# Patient Record
Sex: FEMALE | Race: WHITE | NOT HISPANIC OR LATINO | URBAN - METROPOLITAN AREA
[De-identification: names, ages, dates, MRNs, and addresses within clinical notes are randomized per-mention and may not be internally consistent; named-entity substitution may affect disease eponyms.]

---

## 2022-07-07 RX ORDER — PANTOPRAZOLE SODIUM 40 MG/1
TABLET, DELAYED RELEASE ORAL
COMMUNITY
End: 2022-07-30 | Stop reason: CLARIF

## 2022-07-07 RX ORDER — FLUOXETINE HYDROCHLORIDE 40 MG/1
1 CAPSULE ORAL
COMMUNITY
End: 2022-07-30 | Stop reason: CLARIF

## 2022-07-07 RX ORDER — GABAPENTIN 100 MG/1
1 CAPSULE ORAL
COMMUNITY
End: 2022-07-30 | Stop reason: CLARIF

## 2022-07-07 RX ORDER — LISINOPRIL 40 MG/1
TABLET ORAL
COMMUNITY
End: 2022-07-30 | Stop reason: CLARIF

## 2022-07-07 RX ORDER — LIDOCAINE 5 G/100G
CREAM RECTAL; TOPICAL
COMMUNITY
End: 2022-07-30 | Stop reason: CLARIF

## 2022-07-07 RX ORDER — OXYCODONE HYDROCHLORIDE 5 MG/1
TABLET ORAL
COMMUNITY

## 2022-07-30 PROBLEM — R13.10 DYSPHAGIA: Status: ACTIVE | Noted: 2022-07-30

## 2022-07-30 PROBLEM — M16.11 OSTEOARTHRITIS OF RIGHT HIP: Status: ACTIVE | Noted: 2022-07-30

## 2022-07-30 PROBLEM — K22.2 ESOPHAGEAL STRICTURE: Status: ACTIVE | Noted: 2022-07-30

## 2022-08-01 LAB
ANION GAP SERPL CALC-SCNC: 15 MMOL/L (ref 2–17)
APPEARANCE UR: CLEAR
BASOPHILS # BLD AUTO: 0 X10E3/MCL (ref 0–0.2)
BASOPHILS NFR BLD AUTO: 0.5 % (ref 0–2)
BILIRUB UR STRIP.AUTO-MCNC: NEGATIVE MG/DL
BUN SERPL-MCNC: 13 MG/DL (ref 8–23)
CALCIUM SERPL-MCNC: 9.9 MG/DL (ref 8.8–10.2)
CHLORIDE SERPL-SCNC: 84 MMOL/L (ref 98–107)
COLOR UR: NORMAL
CREAT SERPL-MCNC: 0.5 MG/DL (ref 0.5–1)
DEPRECATED HCO3 PLAS-SCNC: 22 MMOL/L (ref 22–29)
EOSINOPHIL # BLD AUTO: 0.1 X10E3/MCL (ref 0–0.5)
EOSINOPHIL NFR BLD AUTO: 1.3 % (ref 0–7)
ERYTHROCYTE [DISTWIDTH] IN BLOOD BY AUTOMATED COUNT: 13.3 % (ref 11–16)
GFR SERPL CREATININE-BSD FRML MDRD: 95 ML/MIN/1.73M²
GLUCOSE SERPL-MCNC: 96 MG/DL (ref 70–99)
GLUCOSE UR STRIP.AUTO-MCNC: NEGATIVE MG/DL
HCT VFR BLD AUTO: 32.8 % (ref 34–47)
HGB BLD-MCNC: 11.5 G/DL (ref 11.5–15.7)
IMMATURE GRANS (ABS): 0.02 X10E3/MCL (ref 0–0.06)
IMMATURE GRANULOCYTES: 0.3 % (ref 0–0.6)
INFLUENZA A: NOT DETECTED
INFLUENZA B: NOT DETECTED
KETONES UR STRIP.AUTO-MCNC: NEGATIVE MG/DL
LEUKOCYTE ESTERASE UR QL STRIP: NEGATIVE
LYMPHOCYTES # SPEC AUTO: 1.3 X10E3/MCL (ref 1–3.2)
LYMPHOCYTES NFR BLD AUTO: 20.9 % (ref 15–45)
MCH RBC QN AUTO: 32.7 PG (ref 27–34.5)
MCHC RBC AUTO-ENTMCNC: 35.1 G/DL (ref 32–36)
MCV RBC AUTO: 93.2 FL (ref 81–99)
MONOCYTES # BLD AUTO: 0.6 X10E3/MCL (ref 0.3–1)
MONOCYTES NFR BLD AUTO: 9.5 % (ref 4–12)
NEUTROPHILS # BLD AUTO: 4 X10E3/MCL (ref 1.6–7.3)
NEUTROPHILS NFR BLD AUTO: 67.5 % (ref 42–74)
NITRITE UR QL STRIP.AUTO: NEGATIVE
OSMOLALITY SERPL CALC.SUM OF ELEC: 242 MOSM/KG (ref 270–287)
PH UR STRIP.AUTO: 7 [PH] (ref 4.5–8)
PLATELET # BLD AUTO: 230 X10E3/MCL (ref 140–440)
PMV BLD AUTO: 8.9 FL (ref 7.2–13.2)
POTASSIUM SERPL-SCNC: 4.8 MMOL/L (ref 3.5–5.3)
PROT UR QL STRIP: NEGATIVE
RBC # BLD AUTO: 3.52 X10E6/MCL (ref 3.6–5.2)
RBC # UR STRIP: NEGATIVE /UL
SARS-COV-2: NOT DETECTED
SODIUM SERPL-SCNC: 120 MMOL/L (ref 135–145)
SP GR UR STRIP: <=1.005 (ref 1–1.03)
TROPONIN T SERPL-MCNC: <0.01 NG/ML (ref 0–0.01)
UROBILINOGEN UR STRIP-MCNC: 0.2 EU/DL
WBC # BLD AUTO: 6 X10E3/MCL (ref 3.8–10.6)

## 2022-08-02 LAB
ALBUMIN SERPL-MCNC: 3.7 G/DL (ref 3.5–5.2)
ALBUMIN/GLOB SERPL: 2 {RATIO} (ref 1–2.7)
ALP SERPL-CCNC: 112 UNIT/L (ref 35–117)
ALT SERPL-CCNC: 13 UNIT/L (ref 0–35)
ANION GAP SERPL CALC-SCNC: 13 MMOL/L (ref 2–17)
ANION GAP SERPL CALC-SCNC: 15 MMOL/L (ref 2–17)
ANION GAP SERPL CALC-SCNC: 17 MMOL/L (ref 2–17)
APPEARANCE UR: CLEAR
AST SERPL-CCNC: 40 UNIT/L (ref 0–35)
BASOPHILS # BLD AUTO: 0 X10E3/MCL (ref 0–0.2)
BASOPHILS NFR BLD AUTO: 0.7 % (ref 0–2)
BILIRUB SERPL-MCNC: 0.27 MG/DL (ref 0–1.2)
BILIRUB UR STRIP.AUTO-MCNC: NEGATIVE MG/DL
BLOOD, URINE: NEGATIVE
BUN SERPL-MCNC: 14 MG/DL (ref 8–23)
BUN SERPL-MCNC: 14 MG/DL (ref 8–23)
BUN SERPL-MCNC: 15 MG/DL (ref 8–23)
C DIFF TOXIN/ANTIGEN: NEGATIVE
CALCIUM SERPL-MCNC: 9.1 MG/DL (ref 8.8–10.2)
CALCIUM SERPL-MCNC: 9.5 MG/DL (ref 8.8–10.2)
CALCIUM SERPL-MCNC: 9.5 MG/DL (ref 8.8–10.2)
CHLORIDE SERPL-SCNC: 91 MMOL/L (ref 98–107)
COLOR UR: NORMAL
CORTISOL - AM: 12.2 MCG/DL (ref 6–18.4)
CREAT SERPL-MCNC: 0.6 MG/DL (ref 0.5–1)
CREAT SERPL-MCNC: 0.7 MG/DL (ref 0.5–1)
CREAT SERPL-MCNC: 0.8 MG/DL (ref 0.5–1)
CREATINE UR-MCNC: 56.7 MG/DL (ref 28–217)
DEPRECATED HCO3 PLAS-SCNC: 19 MMOL/L (ref 22–29)
DEPRECATED HCO3 PLAS-SCNC: 19 MMOL/L (ref 22–29)
DEPRECATED HCO3 PLAS-SCNC: 20 MMOL/L (ref 22–29)
EOSINOPHIL # BLD AUTO: 0.1 X10E3/MCL (ref 0–0.5)
EOSINOPHIL NFR BLD AUTO: 1.8 % (ref 0–7)
ERYTHROCYTE [DISTWIDTH] IN BLOOD BY AUTOMATED COUNT: 13.8 % (ref 11–16)
GFR SERPL CREATININE-BSD FRML MDRD: 75 ML/MIN/1.73M²
GFR SERPL CREATININE-BSD FRML MDRD: 88 ML/MIN/1.73M²
GFR SERPL CREATININE-BSD FRML MDRD: 91 ML/MIN/1.73M²
GLOBULIN SER CALC-MCNC: 2.3 G/DL (ref 1.9–4.4)
GLUCOSE SERPL-MCNC: 112 MG/DL (ref 70–99)
GLUCOSE SERPL-MCNC: 140 MG/DL (ref 70–99)
GLUCOSE SERPL-MCNC: 90 MG/DL (ref 70–99)
GLUCOSE UR STRIP.AUTO-MCNC: NEGATIVE MG/DL
HCT VFR BLD AUTO: 32.7 % (ref 34–47)
HGB BLD-MCNC: 11 G/DL (ref 11.5–15.7)
IMMATURE GRANS (ABS): 0.01 X10E3/MCL (ref 0–0.06)
IMMATURE GRANULOCYTES: 0.2 % (ref 0–0.6)
KETONES UR STRIP.AUTO-MCNC: NEGATIVE MG/DL
LEUKOCYTE ESTERASE UR QL STRIP: NEGATIVE
LYMPHOCYTES # SPEC AUTO: 0.9 X10E3/MCL (ref 1–3.2)
LYMPHOCYTES NFR BLD AUTO: 16.5 % (ref 15–45)
MAGNESIUM SERPL-MCNC: 2 MG/DL (ref 1.6–2.6)
MCH RBC QN AUTO: 32.2 PG (ref 27–34.5)
MCHC RBC AUTO-ENTMCNC: 33.6 G/DL (ref 32–36)
MCV RBC AUTO: 95.6 FL (ref 81–99)
MONOCYTES # BLD AUTO: 0.5 X10E3/MCL (ref 0.3–1)
MONOCYTES NFR BLD AUTO: 8.6 % (ref 4–12)
NEUTROPHILS # BLD AUTO: 4.1 X10E3/MCL (ref 1.6–7.3)
NEUTROPHILS NFR BLD AUTO: 72.2 % (ref 42–74)
NITRITE UR QL STRIP.AUTO: NEGATIVE
OSMOLALITY SERPL CALC.SUM OF ELEC: 249 MOSM/KG (ref 270–287)
OSMOLALITY SERPL CALC.SUM OF ELEC: 254 MOSM/KG (ref 270–287)
OSMOLALITY SERPL CALC.SUM OF ELEC: 256 MOSM/KG (ref 270–287)
OSMOLALITY UR: 333 MOSM/KG (ref 50–1400)
PH UR STRIP.AUTO: 7 [PH] (ref 4.5–8)
PLATELET # BLD AUTO: 233 X10E3/MCL (ref 140–440)
PMV BLD AUTO: 9.4 FL (ref 7.2–13.2)
POTASSIUM SERPL-SCNC: 4.2 MMOL/L (ref 3.5–5.3)
POTASSIUM SERPL-SCNC: 4.5 MMOL/L (ref 3.5–5.3)
POTASSIUM SERPL-SCNC: 4.6 MMOL/L (ref 3.5–5.3)
POTASSIUM SERPL-SCNC: ABNORMAL MMOL/L (ref 3.5–5.3)
PROT SERPL-MCNC: 6 G/DL (ref 6.4–8.3)
PROT UR QL STRIP: NEGATIVE
RBC # BLD AUTO: 3.42 X10E6/MCL (ref 3.6–5.2)
SODIUM SERPL-SCNC: 124 MMOL/L (ref 135–145)
SODIUM SERPL-SCNC: 125 MMOL/L (ref 135–145)
SODIUM SERPL-SCNC: 127 MMOL/L (ref 135–145)
SODIUM UR-SCNC: 37 MMOL/L
SP GR UR STRIP: <=1.005 (ref 1–1.03)
TSH SERPL DL<=0.05 MIU/L-ACNC: 2.12 MCIU/ML (ref 0.36–3.74)
UROBILINOGEN UR STRIP-MCNC: 0.2 EU/DL
WBC # BLD AUTO: 5.7 X10E3/MCL (ref 3.8–10.6)

## 2022-08-03 LAB
ANION GAP SERPL CALC-SCNC: 12 MMOL/L (ref 2–17)
ANION GAP SERPL CALC-SCNC: 12 MMOL/L (ref 2–17)
ANION GAP SERPL CALC-SCNC: 13 MMOL/L (ref 2–17)
BUN SERPL-MCNC: 10 MG/DL (ref 8–23)
BUN SERPL-MCNC: 11 MG/DL (ref 8–23)
BUN SERPL-MCNC: 13 MG/DL (ref 8–23)
CALCIUM SERPL-MCNC: 8.7 MG/DL (ref 8.8–10.2)
CALCIUM SERPL-MCNC: 9.1 MG/DL (ref 8.8–10.2)
CALCIUM SERPL-MCNC: 9.2 MG/DL (ref 8.8–10.2)
CHLORIDE SERPL-SCNC: 94 MMOL/L (ref 98–107)
CHLORIDE SERPL-SCNC: 94 MMOL/L (ref 98–107)
CHLORIDE SERPL-SCNC: 97 MMOL/L (ref 98–107)
CREAT SERPL-MCNC: 0.6 MG/DL (ref 0.5–1)
DEPRECATED HCO3 PLAS-SCNC: 19 MMOL/L (ref 22–29)
DEPRECATED HCO3 PLAS-SCNC: 21 MMOL/L (ref 22–29)
DEPRECATED HCO3 PLAS-SCNC: 21 MMOL/L (ref 22–29)
ERYTHROCYTE [DISTWIDTH] IN BLOOD BY AUTOMATED COUNT: 13.6 % (ref 11–16)
GFR SERPL CREATININE-BSD FRML MDRD: 91 ML/MIN/1.73M²
GLUCOSE SERPL-MCNC: 81 MG/DL (ref 70–99)
GLUCOSE SERPL-MCNC: 83 MG/DL (ref 70–99)
GLUCOSE SERPL-MCNC: 98 MG/DL (ref 70–99)
HCT VFR BLD AUTO: 30.6 % (ref 34–47)
HGB BLD-MCNC: 10.5 G/DL (ref 11.5–15.7)
MCH RBC QN AUTO: 32.8 PG (ref 27–34.5)
MCHC RBC AUTO-ENTMCNC: 34.3 G/DL (ref 32–36)
MCV RBC AUTO: 95.6 FL (ref 81–99)
OSMOLALITY SERPL CALC.SUM OF ELEC: 251 MOSM/KG (ref 270–287)
OSMOLALITY SERPL CALC.SUM OF ELEC: 254 MOSM/KG (ref 270–287)
OSMOLALITY SERPL CALC.SUM OF ELEC: 258 MOSM/KG (ref 270–287)
PLATELET # BLD AUTO: 205 X10E3/MCL (ref 140–440)
PMV BLD AUTO: 8.7 FL (ref 7.2–13.2)
POTASSIUM SERPL-SCNC: 4.1 MMOL/L (ref 3.5–5.3)
POTASSIUM SERPL-SCNC: 4.4 MMOL/L (ref 3.5–5.3)
POTASSIUM SERPL-SCNC: 4.8 MMOL/L (ref 3.5–5.3)
RBC # BLD AUTO: 3.2 X10E6/MCL (ref 3.6–5.2)
SODIUM SERPL-SCNC: 125 MMOL/L (ref 135–145)
SODIUM SERPL-SCNC: 127 MMOL/L (ref 135–145)
SODIUM SERPL-SCNC: 129 MMOL/L (ref 135–145)
WBC # BLD AUTO: 7.4 X10E3/MCL (ref 3.8–10.6)

## 2022-08-04 LAB
ANION GAP SERPL CALC-SCNC: 12 MMOL/L (ref 2–17)
ANION GAP SERPL CALC-SCNC: 13 MMOL/L (ref 2–17)
BUN SERPL-MCNC: 11 MG/DL (ref 8–23)
BUN SERPL-MCNC: 7 MG/DL (ref 8–23)
CALCIUM SERPL-MCNC: 9.3 MG/DL (ref 8.8–10.2)
CALCIUM SERPL-MCNC: 9.4 MG/DL (ref 8.8–10.2)
CHLORIDE SERPL-SCNC: 95 MMOL/L (ref 98–107)
CHLORIDE SERPL-SCNC: 96 MMOL/L (ref 98–107)
CREAT SERPL-MCNC: 0.5 MG/DL (ref 0.5–1)
CREAT SERPL-MCNC: 0.6 MG/DL (ref 0.5–1)
DEPRECATED HCO3 PLAS-SCNC: 18 MMOL/L (ref 22–29)
DEPRECATED HCO3 PLAS-SCNC: 22 MMOL/L (ref 22–29)
GFR SERPL CREATININE-BSD FRML MDRD: 91 ML/MIN/1.73M²
GFR SERPL CREATININE-BSD FRML MDRD: 95 ML/MIN/1.73M²
GLUCOSE SERPL-MCNC: 137 MG/DL (ref 70–99)
GLUCOSE SERPL-MCNC: 91 MG/DL (ref 70–99)
OSMOLALITY SERPL CALC.SUM OF ELEC: 253 MOSM/KG (ref 270–287)
OSMOLALITY SERPL CALC.SUM OF ELEC: 260 MOSM/KG (ref 270–287)
POTASSIUM SERPL-SCNC: 3.8 MMOL/L (ref 3.5–5.3)
POTASSIUM SERPL-SCNC: 4.3 MMOL/L (ref 3.5–5.3)
SODIUM SERPL-SCNC: 125 MMOL/L (ref 135–145)
SODIUM SERPL-SCNC: 131 MMOL/L (ref 135–145)
TROPONIN T SERPL-MCNC: <0.01 NG/ML (ref 0–0.01)

## 2022-08-05 LAB
ANION GAP SERPL CALC-SCNC: 12 MMOL/L (ref 2–17)
BUN SERPL-MCNC: 15 MG/DL (ref 8–23)
CALCIUM SERPL-MCNC: 9.1 MG/DL (ref 8.8–10.2)
CHLORIDE SERPL-SCNC: 93 MMOL/L (ref 98–107)
CREAT SERPL-MCNC: 0.5 MG/DL (ref 0.5–1)
CREATINE UR-MCNC: 26.9 MG/DL (ref 28–217)
DEPRECATED HCO3 PLAS-SCNC: 24 MMOL/L (ref 22–29)
ERYTHROCYTE [DISTWIDTH] IN BLOOD BY AUTOMATED COUNT: 13.5 % (ref 11–16)
GFR SERPL CREATININE-BSD FRML MDRD: 95 ML/MIN/1.73M²
GLUCOSE SERPL-MCNC: 94 MG/DL (ref 70–99)
HCT VFR BLD AUTO: 30.5 % (ref 34–47)
HGB BLD-MCNC: 10.6 G/DL (ref 11.5–15.7)
MCH RBC QN AUTO: 32.8 PG (ref 27–34.5)
MCHC RBC AUTO-ENTMCNC: 34.8 G/DL (ref 32–36)
MCV RBC AUTO: 94.4 FL (ref 81–99)
OSMOLALITY SERPL CALC.SUM OF ELEC: 259 MOSM/KG (ref 270–287)
OSMOLALITY UR: 297 MOSM/KG (ref 50–1400)
PLATELET # BLD AUTO: 188 X10E3/MCL (ref 140–440)
PMV BLD AUTO: 8.7 FL (ref 7.2–13.2)
POTASSIUM SERPL-SCNC: 4.5 MMOL/L (ref 3.5–5.3)
RBC # BLD AUTO: 3.23 X10E6/MCL (ref 3.6–5.2)
SODIUM SERPL-SCNC: 129 MMOL/L (ref 135–145)
SODIUM UR-SCNC: 77 MMOL/L
WBC # BLD AUTO: 5.9 X10E3/MCL (ref 3.8–10.6)

## 2022-08-06 LAB
ALBUMIN SERPL-MCNC: 4.2 G/DL (ref 3.5–5.2)
ALBUMIN/GLOB SERPL: 2 {RATIO} (ref 1–2.7)
ALP SERPL-CCNC: 130 UNIT/L (ref 35–117)
ALT SERPL-CCNC: 9 UNIT/L (ref 0–35)
ANION GAP SERPL CALC-SCNC: 10 MMOL/L (ref 2–17)
ANION GAP SERPL CALC-SCNC: 11 MMOL/L (ref 2–17)
AST SERPL-CCNC: 14 UNIT/L (ref 0–35)
BASOPHILS # BLD AUTO: 0 X10E3/MCL (ref 0–0.2)
BASOPHILS NFR BLD AUTO: 0.4 % (ref 0–2)
BILIRUB SERPL-MCNC: 0.27 MG/DL (ref 0–1.2)
BUN SERPL-MCNC: 12 MG/DL (ref 8–23)
BUN SERPL-MCNC: 15 MG/DL (ref 8–23)
CALCIUM SERPL-MCNC: 9.6 MG/DL (ref 8.8–10.2)
CALCIUM SERPL-MCNC: 9.8 MG/DL (ref 8.8–10.2)
CHLORIDE SERPL-SCNC: 88 MMOL/L (ref 98–107)
CHLORIDE SERPL-SCNC: 90 MMOL/L (ref 98–107)
CREAT SERPL-MCNC: 0.6 MG/DL (ref 0.5–1)
CREAT SERPL-MCNC: 0.6 MG/DL (ref 0.5–1)
DEPRECATED HCO3 PLAS-SCNC: 23 MMOL/L (ref 22–29)
DEPRECATED HCO3 PLAS-SCNC: 23 MMOL/L (ref 22–29)
EOSINOPHIL # BLD AUTO: 0.1 X10E3/MCL (ref 0–0.5)
EOSINOPHIL NFR BLD AUTO: 2.7 % (ref 0–7)
ERYTHROCYTE [DISTWIDTH] IN BLOOD BY AUTOMATED COUNT: 13.5 % (ref 11–16)
GFR SERPL CREATININE-BSD FRML MDRD: 91 ML/MIN/1.73M²
GFR SERPL CREATININE-BSD FRML MDRD: 91 ML/MIN/1.73M²
GLOBULIN SER CALC-MCNC: 2.2 G/DL (ref 1.9–4.4)
GLUCOSE SERPL-MCNC: 115 MG/DL (ref 70–99)
GLUCOSE SERPL-MCNC: 92 MG/DL (ref 70–99)
HCT VFR BLD AUTO: 33.1 % (ref 34–47)
HGB BLD-MCNC: 11.1 G/DL (ref 11.5–15.7)
IMMATURE GRANS (ABS): 0.01 X10E3/MCL (ref 0–0.06)
IMMATURE GRANULOCYTES: 0.2 % (ref 0–0.6)
LYMPHOCYTES # SPEC AUTO: 0.9 X10E3/MCL (ref 1–3.2)
LYMPHOCYTES NFR BLD AUTO: 17.2 % (ref 15–45)
MCH RBC QN AUTO: 31.8 PG (ref 27–34.5)
MCHC RBC AUTO-ENTMCNC: 33.5 G/DL (ref 32–36)
MCV RBC AUTO: 94.8 FL (ref 81–99)
MONOCYTES # BLD AUTO: 0.4 X10E3/MCL (ref 0.3–1)
MONOCYTES NFR BLD AUTO: 7.5 % (ref 4–12)
NEUTROPHILS # BLD AUTO: 3.8 X10E3/MCL (ref 1.6–7.3)
NEUTROPHILS NFR BLD AUTO: 72 % (ref 42–74)
OSMOLALITY SERPL CALC.SUM OF ELEC: 245 MOSM/KG (ref 270–287)
OSMOLALITY SERPL CALC.SUM OF ELEC: 247 MOSM/KG (ref 270–287)
PLATELET # BLD AUTO: 226 X10E3/MCL (ref 140–440)
PMV BLD AUTO: 9.3 FL (ref 7.2–13.2)
POTASSIUM SERPL-SCNC: 4.2 MMOL/L (ref 3.5–5.3)
POTASSIUM SERPL-SCNC: 4.7 MMOL/L (ref 3.5–5.3)
PROT SERPL-MCNC: 6.3 G/DL (ref 6.4–8.3)
RBC # BLD AUTO: 3.49 X10E6/MCL (ref 3.6–5.2)
SODIUM SERPL-SCNC: 122 MMOL/L (ref 135–145)
SODIUM SERPL-SCNC: 122 MMOL/L (ref 135–145)
WBC # BLD AUTO: 5.2 X10E3/MCL (ref 3.8–10.6)

## 2022-08-07 LAB
ANION GAP SERPL CALC-SCNC: 10 MMOL/L (ref 2–17)
BUN SERPL-MCNC: 18 MG/DL (ref 8–23)
CALCIUM SERPL-MCNC: 9.7 MG/DL (ref 8.8–10.2)
CHLORIDE SERPL-SCNC: 92 MMOL/L (ref 98–107)
CREAT SERPL-MCNC: 0.6 MG/DL (ref 0.5–1)
DEPRECATED HCO3 PLAS-SCNC: 21 MMOL/L (ref 22–29)
ERYTHROCYTE [DISTWIDTH] IN BLOOD BY AUTOMATED COUNT: 13.6 % (ref 11–16)
GFR SERPL CREATININE-BSD FRML MDRD: 91 ML/MIN/1.73M²
GLUCOSE SERPL-MCNC: 96 MG/DL (ref 70–99)
HCT VFR BLD AUTO: 33.5 % (ref 34–47)
HGB BLD-MCNC: 11 G/DL (ref 11.5–15.7)
MCH RBC QN AUTO: 32.6 PG (ref 27–34.5)
MCHC RBC AUTO-ENTMCNC: 32.8 G/DL (ref 32–36)
MCV RBC AUTO: 99.4 FL (ref 81–99)
OSMOLALITY SERPL CALC.SUM OF ELEC: 249 MOSM/KG (ref 270–287)
PLATELET # BLD AUTO: 224 X10E3/MCL (ref 140–440)
PMV BLD AUTO: 9.1 FL (ref 7.2–13.2)
POTASSIUM SERPL-SCNC: 4.2 MMOL/L (ref 3.5–5.3)
RBC # BLD AUTO: 3.37 X10E6/MCL (ref 3.6–5.2)
SODIUM SERPL-SCNC: 123 MMOL/L (ref 135–145)
WBC # BLD AUTO: 5.6 X10E3/MCL (ref 3.8–10.6)

## 2022-08-08 LAB
ANION GAP SERPL CALC-SCNC: 14 MMOL/L (ref 2–17)
BUN SERPL-MCNC: 16 MG/DL (ref 8–23)
CALCIUM SERPL-MCNC: 9.5 MG/DL (ref 8.8–10.2)
CHLORIDE SERPL-SCNC: 92 MMOL/L (ref 98–107)
CREAT SERPL-MCNC: 0.7 MG/DL (ref 0.5–1)
DEPRECATED HCO3 PLAS-SCNC: 20 MMOL/L (ref 22–29)
GFR SERPL CREATININE-BSD FRML MDRD: 88 ML/MIN/1.73M²
GLUCOSE SERPL-MCNC: 92 MG/DL (ref 70–99)
OSMOLALITY SERPL CALC.SUM OF ELEC: 254 MOSM/KG (ref 270–287)
POTASSIUM SERPL-SCNC: 4.2 MMOL/L (ref 3.5–5.3)
SODIUM SERPL-SCNC: 126 MMOL/L (ref 135–145)

## 2022-08-09 LAB
ANION GAP SERPL CALC-SCNC: 13 MMOL/L (ref 2–17)
BUN SERPL-MCNC: 21 MG/DL (ref 8–23)
CALCIUM SERPL-MCNC: 9 MG/DL (ref 8.8–10.2)
CHLORIDE SERPL-SCNC: 93 MMOL/L (ref 98–107)
CREAT SERPL-MCNC: 0.8 MG/DL (ref 0.5–1)
DEPRECATED HCO3 PLAS-SCNC: 20 MMOL/L (ref 22–29)
ERYTHROCYTE [DISTWIDTH] IN BLOOD BY AUTOMATED COUNT: 13.7 % (ref 11–16)
FOLATE SERPL-MCNC: 8.65 NG/ML (ref 4.8–24.2)
GFR SERPL CREATININE-BSD FRML MDRD: 75 ML/MIN/1.73M²
GLUCOSE SERPL-MCNC: 91 MG/DL (ref 70–99)
HCT VFR BLD AUTO: 29.6 % (ref 34–47)
HGB BLD-MCNC: 10.4 G/DL (ref 11.5–15.7)
MAGNESIUM SERPL-MCNC: 1.8 MG/DL (ref 1.6–2.6)
MCH RBC QN AUTO: 33.5 PG (ref 27–34.5)
MCHC RBC AUTO-ENTMCNC: 35.1 G/DL (ref 32–36)
MCV RBC AUTO: 95.5 FL (ref 81–99)
OSMOLALITY SERPL CALC.SUM OF ELEC: 256 MOSM/KG (ref 270–287)
PHOSPHATE SERPL-MCNC: 4.8 MG/DL (ref 2.5–4.5)
PLATELET # BLD AUTO: 211 X10E3/MCL (ref 140–440)
PMV BLD AUTO: 8.5 FL (ref 7.2–13.2)
POTASSIUM SERPL-SCNC: 4.6 MMOL/L (ref 3.5–5.3)
RBC # BLD AUTO: 3.1 X10E6/MCL (ref 3.6–5.2)
SODIUM SERPL-SCNC: 126 MMOL/L (ref 135–145)
VIT B12 SERPL-MCNC: 390 PG/ML (ref 232–1245)
WBC # BLD AUTO: 6.5 X10E3/MCL (ref 3.8–10.6)

## 2022-10-17 NOTE — PROGRESS NOTES
Pharmacy Clinical Interventions - Text       Pharmacy Clinical Interventions Entered On:  8/2/2022 10:36 EDT    Performed On:  8/2/2022 10:34 EDT by Shantell Mckeon   Intervention Type Pharmacy :   Order clarification   Associated Order(s) Pharmacy :   order to change formulation of carbmazepine from oral 200 mg tabs to chewable and med is low risk hazardous and was being cut in half to admin dose   Clinical Importance Pharmacy :   Potentially minor   Medication Safety Reporting Pharmacy :   Non Medication Event   Pharmacist Intervention Time :   6-15 Minutes   56 Kerri Hernandez, Raman Clifton - 8/2/2022 10:34 EDT

## 2022-10-17 NOTE — CASE COMMUNICATION
CM Discharge Planning Assessment - Text       CM Progress Note Entered On:  8/4/2022 10:15 EDT    Performed On:  8/4/2022 10:15 EDT by Bharati Galvan CM Progress Note   CM Home/Lay Caregiver Name/Relationship :   Sarmad Rocha - hsb   CM Home/Lay Caregiver Contact Number :   [phone]   CM Initial Tentative Discharge Plan :   Home with [facility]   CM Alternate Discharge Plan :   SNF   CM Progress Note :   8/2/22 LBL: CM met with pt at [facility]. Pt is admitted following a fall at home, had IGNACIA a few weeks ago. Pt recently discharged to [facility]. She spent a few weeks there and returned home about 3 days ago. Pt was discharged from [facility] with [facility]. SHe lives with her hsb, who is not well himeself. Pt has a RW. Pt has a PCP, Dr Jv Ramirez and uses [facility] for her medications. Pts step dtr called CM to let her know about her and pts hsb's concern about her opioid use. Reportedly, pt has had issues in the past with abusing opioids. She is currently a pt at the [facility]. Step dtr wants her to go to a substance rehab. CM spoke to pt, she does not want to go to a substance rehab and is willing to keep working with [facility]. PT/OT will assess. CM will follow    8.3.22 jml: Pt may d/c tomorrow. I proposed orders for PT/OT for [facility] (OUTPATIENT CAMP[facility]). IM reviewed w/her. Her  is in the ED currently due to leg pain. She reports he drove himself here and will drive her home at d/c as long as he's able. 8.4.22 jml Referral sent to [facility] (OUTPATIENT CAMP[facility]).  update: Pt will likely d/c tomorrow. Dr. Demetrius Garcia would like a MSW and RN for [facility] as well so new orders proposed.      Bharati Galvan - 8/4/2022 10:15 EDT

## 2022-10-17 NOTE — NURSING NOTE
Nursing Discharge Summary - Text       Nursing Discharge Summary Entered On:  8/9/2022 11:09 EDT    Performed On:  8/9/2022 11:08 EDT by Fredy Rojas RN, [facility]   Discharge To :   Family support, Home Health   Mode of Discharge :   Wheelchair   Transportation :   Private vehicle   Accompanied By :   Family member   Lillie Fink - 8/9/2022 11:08 EDT   Education   Responsible Learner(s) :   Living Situation: Home with family support        Performed by: Samantha Brito - 08/08/2022 14:16  Home Caregiver Name/Relationship: Self        Performed by: Shelby Borja - 08/05/2022 21:00  Home Caregiver Phone Number: [phone]        Performed by: Luz CORONA - 08/01/2022 22:11     Home Caregiver Present for Session :   No   Teaching Method :   Explanation, Printed materials   Fredy Rojas RN, Ernestine Harrison - 8/9/2022 11:08 EDT   Post-Hospital Education Adult Grid   Activity Expectations :   Verbalizes understanding   Disease Process :   Verbalizes understanding   Pain Management :   Verbalizes understanding   Plan of Care :   Verbalizes understanding   When to Call Health Care Provider :   [facility] understanding   Chun Hannah - 8/9/2022 11:08 EDT   Health Maintenance Education Adult Grid   Allergies :   Verbalizes understanding   Bathing/Hygiene :   Verbalizes understanding   JUSTINO Brock Lavonda Bread - 8/9/2022 11:08 EDT   Medication Education Adult Grid   Med Dosage, Route, Scheduling :   [facility] understanding   Med Generic/Brand Name, Purpose, Action :   Verbalizes understanding   Fredy Rojas RN, Lavonda Bread - 8/9/2022 11:08 EDT   Safety Education Adult Grid   Safety, Fall :   Verbalizes understanding   Fredy Rojas RN, Lavonda Bread - 8/9/2022 11:08 EDT   Time Spent Educating Patient :   15 minutes   JUSTINO Brock, Ernestine Harrison - 8/9/2022 11:08 EDT

## 2022-10-17 NOTE — PROGRESS NOTES
Inpatient SLP Communication Eval - Text       Inpatient SLP Communication Talat [facility] On:  8/3/2022 10:41 EDT    Performed On:  8/3/2022 10:41 EDT by Charlotte Flores, JOVANI, [address]               Reason for Treatment   *Reason for Referral :   \"neurocognitive evaluation\"     *Chief Complaint :   Pt complains of pain throughout evaluation, distracted due to pain. Subjective Statement :   Pt was seen this date for cognitive evaluation per request of Dr. Latha Goldberg. Per MD history and physical- [facility] Complaint   Right hip pain as well as right jaw pain  History of Present Illness   The patient is a 44-year-old white female with a past medical history of a recent hospitalization and was just discharged on 7/20/2022. She was diagnosed with a GI bleed, gastroparesis, had altered mental status as well as nausea and vomiting. Patient was clinically improved, but sent for rehab due to weakness and deconditioning. In addition patient has anemia, hyponatremia (hypovolemic hyponatremia in May of this year), insomnia, chronic back pain, constipation, hypertension, GERD, cerebral aneurysm (status post repair), esophageal stricture, multiple thoracolumbar vertebral fractures, motion sickness, MRSA, trigeminal neuralgia on the right side of her face, tachycardia, severe PCM as well as ambulatory dysfunction/unsteady gait. The patient apparently had complained of right-sided hip pain since yesterday, however patient has had episodes of right hip pain since her fracture and surgery in late May. She did complain of some shortness of breath and chest discomfort on breathing, but did not states she was short of breath or had chest pain during my evaluation. She did complain of right-sided jaw pain, but this is chronic from her trigeminal neuralgia. The patient denies any recent injury or fall. She has been able to ambulate. She continues to be in subacute rehab. The patient denies fever or chills. She denies nausea, vomiting or diarrhea.  She had a normal bowel movement earlier today, without melena or blood. Her appetite has been poor for some time now. She denies feeling lightheaded or dizzy and has not had a headache. She denies any change in vision, speech or swallowing. She has no new ENT complaints. Patient denies shortness of breath or wheezing. She does have an occasional cough with white phlegm. She denies chest pain or palpitations. She denies abdominal pain and has not had heartburn, indigestion, dysphagia or odynophagia. She has been voiding without difficulty denies any  complaints. She does feel generally weak but has been improving in rehab. She denies any numbness or tingling. She denies any new skin issues. She has had no recent sick contacts. She denies any calf tenderness or leg edema. Patient was worked up in the emergency room lab work, EKG, chest x-ray, x-ray of the right hip as well as CT angiogram of the chest. The patient was afebrile on presentation and has remained so. Her vital signs are stable except for taking hypertensive readings. Her O2 sats on room air are in the high 90s to 100%. On the patient's lab work she did have a normocytic anemia with a hemoglobin of 11.5 and hematocrit 32.8. Otherwise her CBC was normal. Urinalysis was normal. On the patient's chemistries her sodium was 120, chloride 84 with an osmolality of 242. The rest of her BMP was normal. Troponin was less than 0.010. She was negative for flu and COVID. Her EKG revealed a normal sinus rhythm, nonspecific T wave abnormalities and some artifact. Chest x-ray was stable with no new focal consolidation. X-ray of the right hip revealed postsurgical changes. There is a intramedullary perico and screw fixation of a right comminuted trochanteric fracture. Unchanged hardware configuration and stable anatomical alignment. There is no evidence of loosening. There is evidence of interval healing. It also noted osteopenia, degenerative changes of the spine and SI joints. CT angio of the chest revealed no PE. No focal consolidation. There were findings to suggest chronic arthritis. With the patient's marked hyponatremia, she is being admitted for further evaluation and treatment. Review of Systems   10 point review of systems is negative except what is documented above in the HPI  Problem List   Medical History Chronic or Ongoing   No qualifying data Resolved   No qualifying data   Patient Stated Problems Chronic or Ongoing Back pain Cerebral aneurysm Esophageal stricture History of fractured vertebra Hypertension Motion sickness MRSA (methicillin resistant Staphylococcus aureus) Trigeminal neuralgia Resolved   No qualifying data Procedure/Surgical History   Â· Right Hip fracture (05/17/2022)   Â· Dilatation of esophageal stricture (12.2021)   Â· Hysterectomy   Â· Mastectomy   Â· Tonsillectomy   Â· Total replacement of right knee joint  Â· Esophagastroduodenoscopy (07/16/2022)   Â· Femur Closed IM Rodding SCIP (05/17/2022)   Â· Esophagastroduodenoscopy (03/18/2022)   Â· Esophagastroduodenoscopy (03/15/2022)   Â· EGD with or without dilatation in OR (03/10/2022)   Â· Esophagastroduodenoscopy (09/09/2021)   Â· Esophagastroduodenoscopy with Biopsy / Brush (09/09/2021)    Recent CTs of head indicate past craniotomy of L temporal lobe, encephalomalacia and mild to moderae atrophy.       JOVANI MELENDEZ, ELIZABETH SANDRA - 8/3/2022 11:19 EDT   General Information   Speech Orders :   SLP Eval and Treat Acute - 08/02/22 13:01:00 EDT, Stop date 08/02/22 13:01:00 EDT, Neurocognitive assessment     Precautions :   Precaution Orders  Seizure Precautions - Ordered    -- 08/01/22 21:43:00 EDT, Constant Order     Affect/Behavior :   Restless, Other: distractible due to reports of pain   Pain Interfering   With Session :   Yes   JOVANI MELENDEZ, Beltran Driscoll - 8/3/2022 11:19 EDT   Problem List   (As Of: 8/3/2022 11:29:18 EDT)   Problems(Active)    Back pain (SNOMED CT  :446978693 )  Name of Problem:   Back pain ; Recorder:   Tanisha Burrell, [address] [facility]; Confirmation:   Confirmed ; Classification:   Patient Stated ; Code:   [id number] ; Contributor System:   PowerChart ; Last Updated:   3/8/2022 7:49 EST ; Life Cycle Date:   3/8/2022 ; Life Cycle Status:   Active ; Vocabulary:   SNOMED CT        Cerebral aneurysm (SNOMED CT  :026620077 )  Name of Problem:   Cerebral aneurysm ; Recorder:   JUSTINO Fonseca Tiffany A; Confirmation:   Confirmed ; Classification:   Patient Stated ; Code:   [id number] ; Contributor System:   PowerChart ; Last Updated:   3/16/2022 15:17 EDT ; Life Cycle Status:   Active ; Vocabulary:   SNOMED CT        Esophageal stricture (SNOMED CT  :054685294 )  Name of Problem:   Esophageal stricture ; Recorder:   JUSTINO MAJOR JENIFER L; Confirmation:   Confirmed ; Classification:   Patient Stated ; Code:   [id number] ; Contributor System:   PowerChart ; Last Updated:   3/16/2022 15:14 EDT ; Life Cycle Date:   3/16/2022 ; Life Cycle Status:   Active ; Vocabulary:   SNOMED CT        History of fractured vertebra (SNOMED CT  :9711949184 )  Name of Problem:   History of fractured vertebra ; Recorder:   Tanisha Burrell, [address] [facility]; Confirmation:   Confirmed ; Classification:   Patient Stated ; Code:   [id number] ; Contributor System:   PowerChart ; Last Updated:   3/8/2022 7:49 EST ; Life Cycle Date:   3/8/2022 ; Life Cycle Status:   Active ; Vocabulary:   SNOMED CT        Hypertension (SNOMED CT  :2895936406 )  Name of Problem:   Hypertension ; Recorder:   JUSTINO Fonseca Tiffany A; Confirmation:   Confirmed ; Classification:   Patient Stated ; Code:   [id number] ; Contributor System:   PowerChart ; Last Updated:   1/27/2021 17:37 EST ; Life Cycle Date:   1/27/2021 ; Life Cycle Status:   Active ; Vocabulary:   SNOMED CT        Motion sickness (IMO  :98331 )  Name of Problem: Motion sickness ; Recorder:   SYSTEM,  SYSTEM; Confirmation:   Confirmed ; Classification:   Patient Stated ; Code:   63770 ; Last Updated:   3/8/2022 7:50 EST ;  Life Cycle Date:   3/8/2022 ; Life Cycle Status:   Active ; Vocabulary:   IMO        MRSA (methicillin resistant Staphylococcus aureus) (SNOMED CT  :6513530862 )  Name of Problem:   MRSA (methicillin resistant Staphylococcus aureus) ; Onset Date:   3/18/2022 ; Recorder:   Moriah Rivera; Confirmation:   Confirmed ; Classification:   Patient Stated ; Code:   9102157263 ; Contributor System:   [employer] ; Last Updated:   3/22/2022 13:31 EDT ; Life Cycle Date:   3/22/2022 ; Life Cycle Status:   Active ; Vocabulary:   SNOMED CT   ; Comments:        3/22/2022 13:31 - Colton Essie,  Irene Montiel  MRSA Alert - nares      Trigeminal neuralgia (SNOMED CT  :07728092 )  Name of Problem:   Trigeminal neuralgia ; Recorder:   Shaheed Avila RN, Isadora Omalley; Confirmation:   Confirmed ; Classification:   Patient Stated ; Code:   [id number] ; Contributor System:   PowerChart ; Last Updated:   4/5/2021 10:43 EDT ; Life Cycle Date:   4/5/2021 ; Life Cycle Status:   Active ; Vocabulary:   SNOMED CT          Diagnoses(Active)    Acute hyponatremia  Date:   8/1/2022 ; Diagnosis Type:   Discharge ; Confirmation:   Confirmed ; Clinical Dx:   Acute hyponatremia ; Classification:   Medical ; Clinical Service:   Non-Specified ; Code:   ICD-10-CM ; Probability:   0 ; Diagnosis Code:   E87.1      Chronic hip pain  Date:   8/1/2022 ; Diagnosis Type:   Discharge ; Confirmation:   Confirmed ; Clinical Dx:   Chronic hip pain ; Classification:   Medical ; Clinical Service:   Non-Specified ; Code:   ICD-10-CM ; Probability:   0 ; Diagnosis Code:   M25.559      Cognitive communication deficit  Date:   8/3/2022 ; Diagnosis Type:   Reason For Visit ; Confirmation:   Differential ; Clinical Dx:   Cognitive communication deficit ; Classification:   Interdisciplinary ; Clinical Service:   Non-Specified ; Code:   ICD-10-CM ;  Probability:   0 ; Diagnosis Code:   R41.841      Gastroparesis  Date:   8/1/2022 ; Diagnosis Type:   Discharge ; Confirmation:   Confirmed ; Clinical Dx:   Gastroparesis ; Classification:   Medical ; Clinical Service:   Non-Specified ; Code:   ICD-10-CM ; Probability:   0 ; Diagnosis Code:   K31.84      Generalized weakness  Date:   8/1/2022 ; Diagnosis Type:   Discharge ; Confirmation:   Confirmed ; Clinical Dx:   Generalized weakness ; Classification:   Medical ; Clinical Service:   Non-Specified ; Code:   ICD-10-CM ; Probability:   0 ; Diagnosis Code:   R53.1      History of GI bleed  Date:   8/1/2022 ; Diagnosis Type:   Discharge ; Confirmation:   Confirmed ; Clinical Dx:   History of GI bleed ; Classification:   Medical ; Clinical Service:   Non-Specified ; Code:   ICD-10-CM ; Probability:   0 ; Diagnosis Code:   Z87.19      Hypertension  Date:   8/1/2022 ; Diagnosis Type:   Discharge ; Confirmation:   Confirmed ; Clinical Dx:   Hypertension ; Classification:   Patient Stated ; Clinical Service:   Non-Specified ; Code:   ICD-10-CM ; Probability:   0 ; Diagnosis Code:   I10      Insomnia  Date:   8/1/2022 ; Diagnosis Type:   Discharge ; Confirmation:   Confirmed ; Clinical Dx: Insomnia ; Classification:   Medical ; Clinical Service:   Non-Specified ; Code:   ICD-10-CM ; Probability:   0 ; Diagnosis Code:   G47.00      Muscle weakness (generalized)  Date:   8/2/2022 ; Diagnosis Type: Other ; Confirmation:   Differential ; Clinical Dx:   Muscle weakness (generalized) ; Classification:   Interdisciplinary ; Clinical Service:   Non-Specified ; Code:   ICD-10-CM ; Probability:   0 ; Diagnosis Code:   M62.81      Normocytic anemia  Date:   8/1/2022 ; Diagnosis Type:   Discharge ; Confirmation:   Confirmed ; Clinical Dx:   Normocytic anemia ; Classification:   Medical ; Clinical Service:   Non-Specified ; Code:   ICD-10-CM ;  Probability:   0 ; Diagnosis Code:   D64.9      Trigeminal neuralgia  Date:   8/1/2022 ; Diagnosis Type:   Discharge ; Confirmation:   Confirmed ; Clinical Dx:   Trigeminal neuralgia ; Classification:   Patient Stated ; Clinical Service: Non-Specified ; Code:   ICD-10-CM ; Probability:   0 ; Diagnosis Code:   G50.0        Home Environment   Living Environment :   Home Environment  *ADL:    Performed By:  Jairon Hercules 08/02/2022  *Instrumental ADL:    Performed By:  Jairon Hercules 08/02/2022  *Mobility:    Performed By:  Jairon Hercules 08/02/2022  Detail Areas of Responsibilities:    Performed By:  Jairon Hercules 08/02/2022  Devices/Equipment at Home:    Performed By:  Jairon Hercules 08/02/2022  Lives In:    Performed By:  Jairon Hercules 08/02/2022  Lives With:    Performed By:  Jairon Hercules 08/02/2022  Living Situation:    Performed By:  Jairon Hercules 08/02/2022  Number of Stairs Outside:    Performed By:  Jairon Hercules 08/02/2022  Patient's Responsibilities:    Performed By:  Jairon Hercules 08/02/2022  Sensory Deficits:  None, Other: Prescription glasses  Performed By:  Bailey Pierce 08/02/2022     Lives In :   Single level home   Lives With :   Spouse   Living Situation :   Home with family support, Other: Angie De Oliveira PT/OT   JOVANI MELENDEZ Fortunato Elder - 8/3/2022 11:19 EDT   Prior Functional Level Grid   ADL :   Independent   Mobility :   Independent   Instrumental ADL :   Assist needed   Cognitive-Communication Skills :   Assist needed   JOVANI Dawn Fortunato Elder - 8/3/2022 11:19 EDT   Patient's Responsibilities Rehab :   Meal preparation, Personal ADL   Detail Areas of Responsibilities :   Has someone in 2x/month for cleaning   JOVANI Dawn NANCY  - 8/3/2022 11:19 EDT   Hearing Screening   Case History Hearing Screening Grid   Patient Reports Difficulty Hearing :   Baylee SANDRA - 8/3/2022 11:19 EDT   Case History Assessment :   Pass   Informal Hearing Assessment :   Cruzito Farris - 8/3/2022 11:19 EDT   Auditory Comprehension   Auditory Comprehension Details :    Auditory comprehension appears grossly functional for basic through moderately complex level information at the conversational level. \[facility]\"" - 8/3/2022 11:19 EDT   Expression   Expression Additional Details :   Verbal expression appears grossly intact for basic through mod level complexity, no anomia or aphasia noted. JOVANI Menchaca Cheyenne Clap  8/3/2022 11:19 EDT   Pragmatics   Pragmatic Skills Grid   Eye Contact :   Within functional limits   Personal Space :   Within functional limits   Facial Expression :   Within functional limits   Communicative Intent : Within functional limits   Intonation :   Within functional limits   Gestures :   Within functional limits   JOVANI MELENDEZ NANCY J - 8/3/2022 11:19 EDT   Pragmatic Verbal Skills Grid   Initiation :   Within functional limits   Topic Maintenance :   Impaired, distractible   Response Length :   Impaired, distractible   Communicative Intent : Within functional limits   Completeness :   Impaired, distractible   JOVANI MELENDEZ NANCY J - 8/3/2022 11:19 EDT   Pragmatics Additional Details :   Pt frequently distracted by reports of pain throughout evaluation. JOVANI MELENDEZ NANCY J - 8/3/2022 11:19 EDT   Cognitive-Communication   Cognitive Communication Addl Detail :   MOCA administered. Pt achieved 19/30 indicating possible cog dysfunction. Some fluctuating performance due to distraction from pain. Difficulty with attention, memory and some executive function. [facility] 8/3/2022 15:09 EDT   JOVANI Menchaca Cheyenne Clap  8/3/2022 15:09 EDT   Orientation Skills Grid   Person :   Yes   Place :   Yes   Time :   Yes   Situation :   Yes   Biographical Information :   Yes   JOVANI Menchaca Cheyenne Clap - 8/3/2022 11:19 EDT   Attention Skills Grid   Focused :   Impaired   Sustained :   Impaired   Selective :   Impaired   Alternating :   Impaired   Divided :   Impaired   JOVANI MELENDEZ NANCY J - 8/3/2022 11:19 EDT   Memory Skills Grid   Immediate :    Within functional limits   Retrieval :   Impaired, likely related to distraction   Prospective :   Impaired   AL Jaky HAHN - 8/3/2022 11:19 EDT   Organization Skills Grid   Convergent Thinking :   Impaired   Divergent Thinking :   Impaired   JOVANI MELENDEZ NANCY J - 8/3/2022 11:19 EDT   Problem Solving Skills Grid   Functional Simple :   Impaired   Functional Complex :   Impaired   Money Management :   Impaired   Abstract Thinking :   Impaired   JOVANI MELENDEZ NANCY J - 8/3/2022 11:19 EDT   Cognition Executive Function Grid   Awareness :   Impaired   Cognitive Endurance :   Impaired   Initiation :   Impaired   Planning :   Impaired   Reasoning :   Impaired   JOVANI MELENDEZ NANCY J - 8/3/2022 11:19 EDT   Speech Production   Phoneme Production :   Adequate   Speech Rate :   Adequate   Voice Resonance :   Adequate   Voice Quality :   Adequate   Voice Intensity :   Acceptable   Voice Prosody :   Adequate   Voice Production :   Adequate   Respirations, Speech Characteristics :   Adequate   Speech Intelligibility Known Context :   Greater than 90%   Speech Intelligibility Unknown Context :   Greater than 90%   JOVANI MELENDEZ NANCY J - 8/3/2022 15:09 EDT   Oral Structure/Function   Dentition :   Own teeth   Dentition Adequate for Speech :   Yes   JOVANI MELENDEZ Earlene Emery - 8/3/2022 15:09 EDT   Oral Structure and Symmetry Grid   Labial Structure : Within functional limits   Labial Symmetry :   Within functional limits   Lingual Structure : Within functional limits   Lingual Symmetry :   Within functional limits   Velopharyngeal Structure :    Within functional limits   Velopharyngeal Symmetry :   Within functional limits   JOVANI MELENDEZ NANCY J - 8/3/2022 15:09 EDT   Oral Structure/Function Tone Grid   Labial :   Within functional limits   Lingual :   Within functional limits   JOVANI MELENDEZ NANCY J - 8/3/2022 15:09 EDT   Oral Structure Gross Sensation Grid   Labial :   Within functional limits   Lingual :   Within functional limits   Velopharyngeal :   Within functional limits   JOVANI Dawn NANCY J - 8/3/2022 15:09 EDT   Direction - Agatha French 08/03/2022 08:17  Communication:  [employer] understanding, Demonstrates       Performed by:  Izzy French 08/03/2022 08:17  Emotional and Comforting Needs:  [employer] understanding       Performed by:  Izzy French 08/03/2022 08:17  Family Instructions:  [employer] understanding       Performed by:  Izzy French 08/03/2022 08:17  Hand Washing:  [employer] understanding       Performed by:  Izzy French 08/03/2022 08:17  Infection Signs/Symptoms:  Verbalizes understanding       Performed by:  Izzy French 08/03/2022 08:17  Injury Precautions:  Verbalizes understanding       Performed by:  Izzy French 08/03/2022 08:17  Plan of Care:  [employer] understanding       Performed by:  Izzy French 08/03/2022 08:17  Reportable Symptoms:  Verbalizes understanding       Performed by:  Izzy French 08/03/2022 08:17  Tubes/Drains/IV's:  [employer] understanding       Performed by:  Izzy French 08/03/2022 08:17  Unit Procedures:  [employer] understanding       Performed by:  Izzy French 08/03/2022 08:17  Activity of Daily Living Training:  Verbalizes understanding       Performed by:  Izzy French 08/03/2022 08:17  Activity Expectations:  [employer] understanding       Performed by:  Izzy French 08/03/2022 08:17  Activity Precautions:  Verbalizes understanding, Demonstrates       Performed by:  Izzy French 08/03/2022 08:17  Ambulation:  [employer] understanding       Performed by:  Izzy French 08/03/2022 08:17  Ambulatory Devices:  Verbalizes understanding       Performed by:  Izzy French 08/03/2022 08:17  Hygiene:  [employer] understanding       Performed by:  Izzy French 08/03/2022 08:17  Mobility:  Verbalizes understanding       Performed by:  Izzy French 08/03/2022 08:17  Oral Care:  [employer] understanding Performed by:  Consuelo French 08/03/2022 08:17  Positioning:  [employer] understanding       Performed by:  Consuelo French 08/03/2022 08:17  Self Care:  [employer] understanding       Performed by:  Consuelo French 08/03/2022 08:17  Adherence to Compression Measures:  Verbalizes understanding       Performed by:  Consuelo French 08/03/2022 08:17  DVT Prophylaxis:  Verbalizes understanding       Performed by:  Consuelo French 08/03/2022 08:17  Activity Restrictions:  Verbalizes understanding       Performed by:  Consuelo French 08/03/2022 08:17  Ambulation Aid Use:  Verbalizes understanding       Performed by:  Consuelo French 08/03/2022 08:17  Bed, Chair Exit Alarms:  [employer] understanding       Performed by:  Consuelo French 08/03/2022 08:17  Bed Height:  Verbalizes understanding       Performed by:  Consuelo French 08/03/2022 08:17  Handrail, Safety Bar Use:  Verbalizes understanding       Performed by:  Consuelo French 08/03/2022 08:17  Nonskid Footwear Use:  Verbalizes understanding       Performed by:  Consuelo French 08/03/2022 08:17  Remove Clutter:  [employer] understanding       Performed by:  Consuelo French 08/03/2022 08:17  Side Rails for Mobility, Bed Control:  Verbalizes understanding       Performed by:  Consuelo French 08/03/2022 08:17  Toileting Schedule:  [employer] understanding       Performed by:  Consuelo French 08/03/2022 08:17  Transfer Mobility Precautions:  Verbalizes understanding       Performed by:  Consuelo French 08/03/2022 08:17  Wait for Assistance:  [employer] understanding       Performed by:  Consuelo French 08/03/2022 08:17  Home Medication Administration Plan:  [employer] understanding       Performed by:  Consuelo French 08/03/2022 08:17  Med Generic/Brand Name, Purpose, Action:  Verbalizes understanding       Performed by:  Suzy Daniels Giuliana Mak - 08/03/2022 08:17  Med Dosage, Route, Scheduling:  Verbalizes understanding       Performed by:  Casie French 08/03/2022 08:17  Medication Instructions:  Darrall Colace understanding       Performed by:  Casie French 08/03/2022 08:17  Medication Side Effects:  Verbalizes understanding       Performed by:  Casie French 08/03/2022 08:17  Diet/Nutrition:  Darrall Colace understanding       Performed by:  Casie French 08/03/2022 08:17  Meal Frequency:  Darrall Colace understanding       Performed by:  Casie French 08/03/2022 08:17  Analgesic Regimen:  Darrall Colace understanding       Performed by:  Casie French 08/03/2022 08:17  Benefit of Pain Control:  Verbalizes understanding       Performed by:  Casie French 08/03/2022 08:17  Pain Management:  Darrall Colace understanding       Performed by:  Casie French 08/03/2022 08:17  Skin Care:  Darrall Colace understanding       Performed by:  Casie French 08/03/2022 08:17  Advance Directives:  Darrall Colace understanding       Performed by:  Louie French 08/03/2022 04:40  Communication:  Darrall Colace understanding, Demonstrates       Performed by:  Louie DURAN - 08/03/2022 04:40  Emotional and Comforting Needs:  Verbalizes understanding       Performed by:  Louie French 08/03/2022 04:40  Family Instructions:  Darrall Colace understanding       Performed by:  Louie DURAN - 08/03/2022 04:40  Hand Washing:  Verbalizes understanding       Performed by:  Louie French 08/03/2022 04:40  Infection Signs/Symptoms:  Verbalizes understanding       Performed by:  Louie French 08/03/2022 04:40  Injury Precautions:  Verbalizes understanding       Performed by:  Louie DURAN - 08/03/2022 04:40  Plan of Care:  Darrall Colace understanding       Performed by:  Louie French 08/03/2022 04:40  Reportable Symptoms:  Darrall Colace understanding       Performed by:  Violette French 08/03/2022 04:40  Tubes/Drains/IV's:  [employer] understanding       Performed by:  Violette French 08/03/2022 04:40  Unit Procedures:  [employer] understanding       Performed by:  Violette French 08/03/2022 04:40  Activity Restrictions:  Verbalizes understanding       Performed by:  Violette French 08/03/2022 04:40  Ambulation Aid Use:  Verbalizes understanding       Performed by:  Violette French 08/03/2022 04:40  Bed, Chair Exit Alarms:  [employer] understanding       Performed by:  Violette French 08/03/2022 04:40  Bed Height:  Verbalizes understanding       Performed by:  Violette French 08/03/2022 04:40  Handrail, Safety Bar Use:  Verbalizes understanding       Performed by:  Violette French 08/03/2022 04:40  Nonskid Footwear Use:  [employer] understanding       Performed by:  Violette French 08/03/2022 04:40  Remove Clutter:  [employer] understanding       Performed by:  Violette French 08/03/2022 04:40  Side Rails for Mobility, Bed Control:  Verbalizes understanding       Performed by:  Violette French 08/03/2022 04:40  Toileting Schedule:  [employer] understanding       Performed by:  Violette French 08/03/2022 04:40  Transfer Mobility Precautions:  Verbalizes understanding       Performed by:  Violette French 08/03/2022 04:40  Wait for Assistance:  [employer] understanding       Performed by:  Violette French 08/03/2022 04:40  Activity of Daily Living Training:  Verbalizes understanding       Performed by:  Violette French 08/03/2022 04:40  Ambulation:  [employer] understanding       Performed by:  Violette French 08/03/2022 04:40  Hygiene:  [employer] understanding       Performed by:  Violette French 08/03/2022 04:40  Mobility:  Verbalizes understanding       Performed by:  Violette French 08/03/2022 04:40  Positioning:  Verbalizes understanding       Performed by:  Rupa French 08/03/2022 04:40  Home Medication Administration Plan:  [employer] understanding       Performed by:  Rupa French 08/03/2022 04:40  Med Generic/Brand Name, Purpose, Action:  Verbalizes understanding       Performed by:  Rupa French 08/03/2022 04:40  Med Dosage, Route, Scheduling:  [employer] understanding       Performed by:  Rupa French 08/03/2022 04:40  Medication Instructions:  [employer]aya understanding       Performed by:  Rupa French 08/03/2022 04:40  Medication Side Effects:  Verbalizes understanding       Performed by:  Rupa French 08/03/2022 04:40  Diet/Nutrition:  [employer] understanding       Performed by:  Rupa French 08/03/2022 04:40  Meal Frequency:  [employer] understanding       Performed by:  Rupa French 08/03/2022 04:40  Analgesic Regimen:  Verbalizes understanding       Performed by:  Rpua French 08/03/2022 04:40  Benefit of Pain Control:  Verbalizes understanding       Performed by:  Rupa French 08/03/2022 04:40  Pain Management:  Verbalizes understanding       Performed by:  Rupa DURAN - 08/03/2022 04:40  Skin Care:  [employer] understanding       Performed by:  Rupa French 08/03/2022 04:40  Positioning, Changes Independently:  Verbalizes understanding       Performed by:  Rupa French 08/03/2022 04:40  Physical Therapy Plan of Care:  Verbalizes understanding       Performed by:  Az Walton - 08/02/2022 14:30  Plan of Care:  [employer] understanding       Performed by:  Rock Moeller - 08/02/2022 13:30  Advance Directives:  [employer] understanding       Performed by:  Mega Lou - 08/02/2022 08:17  Allergies:  Verbalizes understanding       Performed by:  Mega Lou - 08/02/2022 08:17  Communication: Verbalizes understanding, Demonstrates       Performed by:  Nany Elise  - 08/02/2022 08:17  Emotional and Comforting Needs:  Verbalizes understanding       Performed by:  Nany Elise  08/02/2022 08:17  Family Instructions:  Hayley Radar understanding       Performed by:  Nany Elise - 08/02/2022 08:17  Hand Washing:  Hayley Radar understanding       Performed by:  Nany Elise  08/02/2022 08:17  Infection Signs/Symptoms:  Verbalizes understanding       Performed by:  Nany Elise - 08/02/2022 08:17  Injury Precautions:  Verbalizes understanding       Performed by:  Nany Elise  08/02/2022 08:17  Plan of Care:  Hayley Radar understanding       Performed by:  Nany Elise  08/02/2022 08:17  Reportable Symptoms:  Verbalizes understanding       Performed by:  Nany Elise - 08/02/2022 08:17  Tubes/Drains/IV's:  Hayley Radar understanding       Performed by:  Nany Elise - 08/02/2022 08:17  Unit Procedures:  Hayley Radar understanding       Performed by:  Nany Elise  08/02/2022 08:17  Activity of Daily Living Training:  Verbalizes understanding       Performed by:  Nany Elise  08/02/2022 08:17  Activity Expectations:  Hayely Radar understanding       Performed by:  Nany Elise - 08/02/2022 08:17  Activity Precautions:  Verbalizes understanding, Demonstrates       Performed by:  Nany Elise  08/02/2022 08:17  Ambulation:  Hayley Radar understanding       Performed by:  Nany Elise  08/02/2022 08:17  Ambulatory Devices:  Verbalizes understanding       Performed by:  Nany Elise  08/02/2022 08:17  Hygiene:  Hayley Radar understanding       Performed by:  Nany Elise  08/02/2022 08:17  Mobility:  Hayley Radar understanding       Performed by:  Nany Elise - 08/02/2022 08:17  Oral Care:  Hayley Radar understanding       Performed by:  Nany Elise  08/02/2022 08:17  Positioning:  Verbalizes understanding       Performed by:  Mega French 08/02/2022 08:17  Self Care:  Cristina Parlor understanding       Performed by:  Mega French 08/02/2022 08:17  Adherence to Compression Measures:  Verbalizes understanding       Performed by:  Mega French 08/02/2022 08:17  DVT Prophylaxis:  Verbalizes understanding       Performed by:  Mega French 08/02/2022 08:17  Activity Restrictions:  Verbalizes understanding       Performed by:  Mega French 08/02/2022 08:17  Ambulation Aid Use:  Verbalizes understanding       Performed by:  Mega French 08/02/2022 08:17  Bed, Chair Exit Alarms:  Cristina Parlor understanding       Performed by:  Mega French 08/02/2022 08:17  Bed Height:  Verbalizes understanding       Performed by:  Mega French 08/02/2022 08:17  Handrail, Safety Bar Use:  Verbalizes understanding       Performed by:  Mega French 08/02/2022 08:17  Nonskid Footwear Use:  Cristina Parlor understanding       Performed by:  Mega French 08/02/2022 08:17  Remove Clutter:  Cristina Parlor understanding       Performed by:  Mega French 08/02/2022 08:17  Side Rails for Mobility, Bed Control:  Verbalizes understanding       Performed by:  Mega French 08/02/2022 08:17  Toileting Schedule:  Cristina Parmarjan understanding       Performed by:  Mega French 08/02/2022 08:17  Transfer Mobility Precautions:  Verbalizes understanding       Performed by:  Mega French 08/02/2022 08:17  Wait for Assistance:  Cristina Rubio understanding       Performed by:  Mega French 08/02/2022 08:17  Home Medication Administration Plan:  Cristina Parlor understanding       Performed by:  Mega French 08/02/2022 08:17  Med Generic/Brand Name, Purpose, Action:  Verbalizes understanding       Performed by:  Mega French 08/02/2022 08:17  Med Dosage, Route, Scheduling:  Verbalizes understanding       Performed by:  Joe French 08/02/2022 08:17  Medication Instructions:  [employer] understanding       Performed by:  Joe French 08/02/2022 08:17  Medication Side Effects:  Verbalizes understanding       Performed by:  Joe French 08/02/2022 08:17  Meal Frequency:  [employer] understanding       Performed by:  Joe French 08/02/2022 08:17  Diet/Nutrition:  [employer] understanding       Performed by:  Joe French 08/02/2022 08:17  Analgesic Regimen:  [employer] understanding       Performed by:  Joe French 08/02/2022 08:17  Benefit of Pain Control:  Verbalizes understanding       Performed by:  Joe French 08/02/2022 08:17  Pain Management:  [employer] understanding       Performed by:  Joe French 08/02/2022 08:17  Skin Care:  [employer]aya understanding       Performed by:  Joe French 08/02/2022 08:17  Communication:  [employer]aya understanding       Performed by:  Harry French 08/01/2022 23:53  Emotional and Comforting Needs:  [employer] understanding       Performed by:  Harry French 08/01/2022 23:53  Infection Control:  [employer]aya understanding       Performed by:  Harry French 08/01/2022 23:53  Injury Precautions:  Verbalizes understanding       Performed by:  Harry French 08/01/2022 23:53  Plan of Care:  [employer]aya understanding       Performed by:  Harry French 08/01/2022 23:53     Vipshannon 24, SLP, Hiram [address] 8/3/2022 15:09 EDT   Education   Home Caregiver Name/Relationship :   Franchesca Traylor -    Levar Stovercarmelo - 8/3/2022 15:09 EDT   Plan   Treatment Recommendations :   None at this time   SLP Evaluation Complete :   Yes   Levar Romero - 8/3/2022 15:19 EDT   SLP Evaluation Date :   8/3/2022 10:40 EDT   Plan/Goals Established With Patient/Caregiver :   Ava Mulligan, JOVANI, ELIZABETH French 8/3/2022 15:09 EDT   Speech Clinical Assessment Summary :   Pt was seen this date for speech language and cognitive evaluation. Pt was alert but distracted due to persistent complaints of pain (nursing aware). She had frequent moaning througout evaluation. Pt noted to have appropriate verbal expression and auditory comprehension. Completed Ricardo Cognitive Assessement. Pt scored 19/30 which may correlate with cognitive impairment. No recent neurological events noted. Most recent head CT noted \"Prior left temporal craniotomy. 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 No mass effect, abnormal extra-axial fluid collection, or hydrocephalus. 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 \"  Primary areas of issue were in executive function, attention, memory and organization. Unclear if her performance was strictly related to cognitive impairment or possibly influenced due to distraction from pain complaints. At this time, pt appears to be at baseline cognitive function. Skilled ST tx services are not indicated at this time. May need more resources for assistance at home due to baseline cog issues. Discussed evaluation with patient,  and with Daniel Angelo RN. Please contact SLP if any questions re; this evaluation.         JOVANI Chinchilla Jenet Shorten - 8/3/2022 15:19 EDT   Time Spent With Patient   SLP Time In :   10:00 EST   SLP Time Out :   10:40 EST   SLP Speech Language Evaluation :   45 Minutes   SLP Fuentes Del Valle Time :   40 minutes   SLP Total Individual Therapy Time :   40 minutes   SLP Total Timed Code Treatment Units :   3 units   SLP Total Tx Time :   40 minutes   JOVANI MELENDEZ Jenet Shorten - 8/3/2022 15:19 EDT

## 2022-10-17 NOTE — CASE COMMUNICATION
CM Discharge Planning Assessment - Text       CM Progress Note Entered On:  8/3/2022 12:58 EDT    Performed On:  8/3/2022 12:56 EDT by Bharati Galvan CM Progress Note   CM Home/Lay Caregiver Name/Relationship :   Sarmad Rocha - hsb   CM Home/Lay Caregiver Contact Number :   [phone]   CM Initial Tentative Discharge Plan :   Home with [facility] Alternate Discharge Plan :   SNF   CM Progress Note :   8/2/22 LBL: CM met with pt at [facility]. Pt is admitted following a fall at home, had IGNACIA a few weeks ago. Pt recently discharged to [facility]. She spent a few weeks there and returned home about 3 days ago. Pt was discharged from [facility] with [facility]. SHe lives with her hsb, who is not well himeself. Pt has a RW. Pt has a PCP, Dr Jv Ramirez and uses [facility] for her medications. Pts step dtr called CM to let her know about her and pts hsb's concern about her opioid use. Reportedly, pt has had issues in the past with abusing opioids. She is currently a pt at [facility]. Step dtr wants her to go to a substance rehab. CM spoke to pt, she does not want to go to a substance rehab and is willing to keep working with [facility]. PT/OT will assess. CM will follow    8.3.22 jml: Pt may d/c tomorrow. I proposed orders for PT/OT for [facility] IM reviewed w/her. Her  is in the ED currently due to leg pain. She reports he drove himself here and will drive her home at d/c as long as he's able.       Bharati Galvan - 8/3/2022 12:56 EDT

## 2022-10-17 NOTE — ED NOTES
ED Triage Note       ED Triage Adult Entered On:  8/1/2022 17:17 EDT    Performed On:  8/1/2022 17:10 EDT by Laureano BROWN               Triage   Temperature Oral :   37.1 degC(Converted to: 98.8 degF)    Laureano BROWN - 8/1/2022 17:21 EDT   Dosing Weight Obtained By :   Patient stated   Laureano French 8/1/2022 17:20 EDT   Numeric Rating Pain Scale :   10 = Worst possible pain   Laureano French 8/1/2022 17:10 EDT   Chief Complaint :   Patient BIBA c/o right hip pain. Patient reports having hip surgery here approx 2 wks ago, but records show surgery was in May. reports increased pain since yesterday. Has been at [facility] for PT/rehab. Pt also reports \"it hurts to breathe\". Laureano French 8/1/2022 17:20 EDT     [facility] See ([facility]) Mode of Arrival :   Ambulance   Infectious Disease Documentation :   Document assessment   Heart Rate Monitored :   73 bpm   Respiratory Rate :   20 br/min   Systolic Blood Pressure :   165 mmHg (HI)    Diastolic Blood Pressure :   84 mmHg   SpO2 :   97 %   Oxygen Therapy :   Room air   Patient presentation :   None of the above   Chief Complaint or Presentation suggest infection :   No   Weight Dosing :   48 kg(Converted to: 105 lb 13 oz)    Height :   165 cm(Converted to: 5 ft 5 in)    Body Mass Index Dosing :   18 kg/m2   Laureano French 8/1/2022 17:10 EDT   DCP GENERIC CODE   Tracking Group :   ED [facility] Tracking Group   Tracking Acuity :   3   Laureano BROWN - 8/1/2022 17:10 EDT   ED General Section :   Document assessment   Pregnancy Status :   N/A   ED Allergies Section :   Document assessment   ED Reason for Visit Section :   Document assessment   ED Quick Assessment :   Patient appears awake, alert, oriented to baseline. Skin warm and dry. Moves all extremities. Respiration even and unlabored. Appears in no apparent distress.    Laureano French 8/1/2022 17:10 EDT   PTA/Triage Treatments   ED PTA Pre-Arrival Service :   [facility] Ritu Elizabeth C-RN - 8/1/2022 17:10 EDT   ID Risk Screen Symptoms   Recent Travel History :   No recent travel   TB Symptom Screen :   No symptoms   Last 90 days COVID-19 ID :   No   Close Contact with COVID-19 ID :   No   Last 14 days COVID-19 ID :   Yes - Not Detected (negative)   C. diff Symptom/History ID :   Neither of the above   Ritu Elizabeth C-RN - 8/1/2022 17:10 EDT   Allergies   (As Of: 8/1/2022 17:17:19 EDT)   Allergies (Active)   codeine  Estimated Onset Date:   Unspecified ; Reactions:   Nausea ; Created By:   Coreen Miramontes RN, Valeria Do; Reaction Status:   Active ; Category:   Drug ; Substance:   codeine ; Type: Allergy ; Severity:   Mild ; Updated By:   Doris Mcgrath; Reviewed Date:   7/16/2022 1:06 EDT      HYDROcodone  Estimated Onset Date:   Unspecified ; Reactions:   Agitation ; Created By:   Orquideaaline Loop; Reaction Status:   Active ; Category:   Drug ; Substance:   HYDROcodone ; Type: Allergy ; Severity:   Unknown ; Updated By:   Susu Petersen; Reviewed Date:   7/16/2022 1:06 EDT      Latex  Estimated Onset Date:   Unspecified ; Reactions:   IRRITATES SKIN ; Created By:   Jose Brantley RN, JESS; Reaction Status:   Active ; Category:   Drug ; Substance:   Latex ; Type: Allergy ; Severity:   Moderate ; Updated By:   Nic Hercules; Reviewed Date:   7/16/2022 1:06 EDT      Talwin  Estimated Onset Date:   Unspecified ; Reactions:   Hypoxia, HYP ; Created By:   Star Varghese; Reaction Status:   Active ; Category:   Drug ; Substance: Talwin ; Type: Allergy ; Severity:   Severe ; Updated By:   Star Varghese; Reviewed Date:   7/16/2022 1:06 EDT      traMADol  Estimated Onset Date:   Unspecified ; Reactions:   Unknown ; Created By:   Margo BARAKAT; Reaction Status:   Active ; Category:   Drug ; Substance:   traMADol ; Type: Allergy ;  Severity:   Unknown ; Updated By:   Margo BARAKAT; Reviewed Date:   7/16/2022 1:06 EDT        Psycho-Social   Last 3 mo, thoughts killing self/others :   Patient denies   Right click within box for Suspected Abuse policy link. :   None   Feels Safe Where Live :   Yes   ED Behavioral Activity Rating Scale :   4 - Quiet and awake (normal level of activity)   Lana Givens AMINARN - 8/1/2022 17:10 EDT   ED Reason for Visit   (As Of: 8/1/2022 17:17:19 EDT)   Problems(Active)    Back pain (SNOMED CT  :875822557 )  Name of Problem:   Back pain ; Recorder:   Lizbeth Elias RN, [address] [facility]; Confirmation:   Confirmed ; Classification:   Patient Stated ; Code:   [id number] ; Contributor System:   PowerChart ; Last Updated:   3/8/2022 7:49 EST ; Life Cycle Date:   3/8/2022 ; Life Cycle Status:   Active ; Vocabulary:   SNOMED CT        Cerebral aneurysm (SNOMED CT  :209504745 )  Name of Problem:   Cerebral aneurysm ; Recorder:   JUSTINO Fonseca Tiffany A; Confirmation:   Confirmed ; Classification:   Patient Stated ; Code:   [id number] ; Contributor System:   PowerChart ; Last Updated:   3/16/2022 15:17 EDT ; Life Cycle Status:   Active ; Vocabulary:   SNOMED CT        Esophageal stricture (SNOMED CT  :618930297 )  Name of Problem:   Esophageal stricture ; Recorder:   JUSTINO MAJOR JENIFER L; Confirmation:   Confirmed ; Classification:   Patient Stated ; Code:   [id number] ; Contributor System:   PowerChart ; Last Updated:   3/16/2022 15:14 EDT ; Life Cycle Date:   3/16/2022 ; Life Cycle Status:   Active ; Vocabulary:   SNOMED CT        History of fractured vertebra (SNOMED CT  :0580469408 )  Name of Problem:   History of fractured vertebra ; Recorder:   David Andersen, [address] [facility]; Confirmation:   Confirmed ; Classification:   Patient Stated ; Code:   [id number] ; Contributor System:   PowerChart ; Last Updated:   3/8/2022 7:49 EST ; Life Cycle Date:   3/8/2022 ; Life Cycle Status:   Active ; Vocabulary:   SNOMED CT        Hypertension (SNOMED CT  :6348492433 )  Name of Problem:   Hypertension ;  Recorder:   JUSTINO Fonseca Tiffany A; Confirmation:   Confirmed ; Classification:   Patient Stated ; Code:   [id number] ; Contributor System:   SproutBox ; Last Updated:   1/27/2021 17:37 EST ; Life Cycle Date:   1/27/2021 ; Life Cycle Status:   Active ; Vocabulary:   SNOMED CT        Motion sickness (IMO  :82713 )  Name of Problem: Motion sickness ; Recorder:   SYSTEM,  SYSTEM; Confirmation:   Confirmed ; Classification:   Patient Stated ; Code:   55993 ; Last Updated:   3/8/2022 7:50 EST ; Life Cycle Date:   3/8/2022 ; Life Cycle Status:   Active ; Vocabulary:   IMO        MRSA (methicillin resistant Staphylococcus aureus) (SNOMED CT  :1912595070 )  Name of Problem:   MRSA (methicillin resistant Staphylococcus aureus) ; Onset Date:   3/18/2022 ; Recorder:   [place]; Confirmation:   Confirmed ; Classification:   Patient Stated ; Code:   [id number] ; Contributor System:   SproutBox ; Last Updated:   3/22/2022 13:31 EDT ; Life Cycle Date:   3/22/2022 ; Life Cycle Status:   Active ; Vocabulary:   SNOMED CT   ; Comments:        3/22/2022 13:31 - Shashi Lovelace  MRSA Alert - nares      Trigeminal neuralgia (SNOMED CT  :71115083 )  Name of Problem:   Trigeminal neuralgia ; Recorder:   Sharron Burnett RN, Michelle Frazier; Confirmation:   Confirmed ; Classification:   Patient Stated ; Code:   [id number] ; Contributor System:   SproutBox ; Last Updated:   4/5/2021 10:43 EDT ; Life Cycle Date:   4/5/2021 ; Life Cycle Status:   Active ; Vocabulary:   SNOMED CT          Diagnoses(Active)    Hip pain-swelling  Date:   8/1/2022 ; Diagnosis Type:   Reason For Visit ; Confirmation:   Complaint of ; Clinical Dx:    Hip pain-swelling ; Classification:   Medical ; Clinical Service:   Emergency medicine ; Code:   PNED ; Probability:   0 ; Diagnosis Code:   [id number]

## 2022-10-17 NOTE — CASE COMMUNICATION
[facility].  update: Pt will likely d/c tomorrow. Dr. Alberto Sinha would like a MSW and RN for [facility] as well so new orders proposed. 8.5.22 jml: I met with pt; she c/o pain. I talked with her about [facility] vs [facility] for the additional [facility] services. She is in agreement; she did not have a [facility] agency preference. Referral sent to [facility]. IM presented and copy provided. She may d/c later today. 8.8.22 jml: Pt may be ready for d/c tomorrow per Dr. Reyna Cowan. 8.9.22 jml: Pt is d/c'ing home today. She feels ready to go. I [facility] presented to her; she still has a copy. Her  and a neighbor will drive her home. All known d/c needs have been met.       Discharge Planning Assessment Complete :   Yes   Dai Boss - 8/9/2022 10:38 EDT

## 2022-10-17 NOTE — ED NOTES
ED Patient Summary                 [facility]  [address], [place], [address] [place] [address]  [phone]  Discharge Instructions (Patient)  Poncho Schreiber  :  1940                   MRN: [id number]                   FIN: [id number]  Reason For Visit: Hip pain-swelling; EMS / HIP PAIN / SX 3WKS AGO  Final Diagnosis: Acute hyponatremia; Chronic bronchitis; Chronic hip pain; Pleuritic chest pain     Visit Date: 2022 17:05:00  Address: [address] [place] [id number]  Phone: [phone]     Emergency Department Providers:         Primary Physician:      Remona Severs [facility] would like to thank you for allowing us to assist you with your healthcare needs. The following includes patient education materials and information regarding your injury/illness. Follow-up Instructions: You were seen today on an emergency basis. Please contact your primary care doctor for a follow up appointment. If you received a referral to a specialist doctor, it is important you follow-up as instructed. It is important that you call your follow-up doctor to schedule and confirm the location of your next appointment. Your doctor may practice at multiple locations. The office location of your follow-up appointment may be different to the one written on your discharge instructions. If you do not have a primary care doctor, please call [phone] for help in finding a Mino Ivey. [facility] Provider. For help in finding a specialist doctor, please call ..[phone]. If your condition gets worse before your follow-up with your primary care doctor or specialist, please return to the Emergency Department. Coronavirus 2019 (COVID-19) Reminders:     Patients age 15 - 24, with parental consent, and over age 25 can make an appointment for a COVID-19 vaccine.  Patients can contact their [address] [facility] doctors' offices to schedule an appointment to receive the COVID-19 vaccine. Patients who do not have a [facility] physician can call [phone] to schedule vaccination appointments. Follow Up Appointments:  Primary Care Provider:     Name: Alejandra Hill     Phone: [phone]          [facility] SERVICES%>             Medications that have not changed  Other Medications  acetaminophen (Tylenol Regular Strength 325 mg oral tablet) 1 Tabs Oral (given by mouth) 3 times a day. Last Dose:____________________  carBAMazepine (carBAMazepine 200 mg oral tablet) TAKE 1/2 (ONE-HALF) TABLET BY MOUTH THREE TIMES DAILY. Last Dose:____________________  docusate (Colace) 100 Milligram Oral (given by mouth) 2 times a day. Last Dose:____________________  gabapentin (gabapentin 100 mg oral capsule) 1 Capsules Oral (given by mouth) 3 times a day. Last Dose:____________________  lidocaine topical (lidocaine 4% patch) Topical (on the skin) every day. Last Dose:____________________  lisinopril (lisinopril 40 mg oral tablet) 1 Tabs Oral (given by mouth) once a day (in the morning). Last Dose:____________________  melatonin (Melatonin 3 mg oral tablet) 1 Tabs Oral (given by mouth) Once a Day (at bedtime) as needed. Last Dose:____________________  metoclopramide (metoclopramide 10 mg oral tablet) 0.5 Tabs Oral (given by mouth) four times a day (before meals and at bedtime). Last Dose:____________________  metoprolol (metoprolol tartrate 25 mg oral tablet) 0.5 Tabs Oral (given by mouth) 2 times a day. Last Dose:____________________  Misc Medication (ASPIRIN LOW EC 81MG TAB) TAKE 1 TABLET BY MOUTH ONCE DAILY AS DIRECTED. Last Dose:____________________  omeprazole (PriLOSEC 40 mg oral delayed release capsule) 1 Capsules Oral (given by mouth) 2 times a day.   Last Dose:____________________  ondansetron (Zofran 4 mg oral tablet) 1 Tabs Oral (given by mouth) every 8 hours as needed as needed for nausea/vomiting. Last Dose:____________________  traZODone (traZODone 150 mg oral tablet) 1 Tabs Oral (given by mouth) Once a Day (at bedtime). Last Dose:____________________  zinc sulfate (zinc sulfate 220 mg oral capsule) 1 Capsules Oral (given by mouth) every day. Last Dose:____________________      Allergy Info: traMADol; HYDROcodone; Latex; Talwin; codeine     Discharge Additional Information    Patient Education Materials:       ---------------------------------------------------------------------------------------------------------------------  [employer] allows patients to review your COVID and other test results as well as discharge documents from any [facility], Emergency Department, surgical center or outpatient lab. Test results are typically available 36 hours after the test is completed. [address] encourages you to self-enroll in the [employer] Patient Portal.     To begin your self-enrollment process, please visit [email or web address] Under [employer], click on Sign up now. NOTE: You must be 16 years and older to use [employer] Self-Enroll online. If you are a parent, caregiver, or guardian; you need an invite to access your childs or dependents health records. To obtain an invite, contact the Medical Records department at [phone] Monday through Friday, 8-4:30, select option 3 . If we receive your call afterhours, we will return your call the next business day. If you have issues trying to create or access your account, contact [employer] at [phone] available 7 days a week 24 hours a day.      Comment:

## 2022-10-17 NOTE — CASE COMMUNICATION
CM D/C Planning Assessment Ongoing- Text       CM Admission Assessment Entered On:  8/2/2022 13:46 EDT    Performed On:  8/2/2022 13:29 EDT by Emmett Romero               CM Admission Assessment   CM Reason for Care Management Referral :   Discharge planning assessment   CM Insurance Information :   Managed Medicare   CM Name of Patient Pharmacy :   [facility]   NIESHA Date and Time Inpatient Order :   8/1/2022 21:41 EDT   CM Patient has PCP :   Yes   CM PCP Search :   Claudia MARTINEZ   CM Assessment Discussion With :   Family/Caregiver, Patient   CM Home/Lay Caregiver Name/Relationship :   Andres Rodriguez - hsb   CM Home/Lay Caregiver Contact Number :   [phone]   CM Living Situation :   Home Health   CM Patient Admitted From :   [facility] (OUTPATIENT CAMPUS)   Current Equipment and Treatments :   Rolling walker   CM Initial Tentative Discharge Plan :   Home with [facility]   CM Alternate Discharge Plan :   SNF   Anticipated Hosp Related Barriers to DC :   None identified   CM Home Barriers :   None   Community Resources and Special Services :   Substance Abuse Treatment   CM Progress Note :   8/2/22 LBL: CM met with pt at [facility]. Pt is admitted following a fall at home, had IGNACIA a few weeks ago. Pt recently discharged to [facility]. She spent a few weeks there and returned home about 3 days ago. Pt was discharged from [facility] with [facility] [place]. SHe lives with her hsb, who is not well himeself. Pt has a RW. Pt has a PCP, Dr Divine Bahena and uses [facility] for her medications. Pts step dtr called CM to let her know about her and pts hsb's concern about her opioid use. Reportedly, pt has had issues in the past with abusing opioids. She is currently a pt at the [facility]. Step dtr wants her to go to a substance rehab. CM spoke to pt, she does not want to go to a substance rehab and is willing to keep working with [facility]. PT/OT will assess.  CM will follow     CM Admission Assessment Complete :   Yes   Yajaira Hazel 8/2/2022 13:29 EDT

## 2022-10-17 NOTE — ED PROVIDER NOTES
General Medical Problem *ED        Patient:   Tia Villalta             MRN: [id number]            FIN: [id number]               Age:   80 years     Sex:  Female     :  1940   Associated Diagnoses:   Acute hyponatremia; Chronic bronchitis; Pleuritic chest pain; Chronic hip pain   Author:   Oly Dangelo      Basic Information   Time seen: Provider Seen ()   ED Provider/Time:    Oly Dangelo / 2022 17:06  . Additional information: Chief Complaint from Nursing Triage Note   Chief Complaint  Chief Complaint: Patient BIBA c/o right hip pain. Patient reports having hip surgery here approx 2 wks ago, but records show surgery was in May. reports increased pain since yesterday. Has been at [facility] for PT/rehab. Pt also reports \"it hurts to breathe\". (22 17:10:00). History of Present Illness   Patient is a 81-year-old female who presents to the emergency department for evaluation of multiple complaints. She states that she had right hip surgery \"2 weeks ago,\" though records indicate that she had ORIF performed right hip fracture back in May with Dr. Burgess Prather. She states she has been having pain persistently since that time despite medications. On review of records, there was some concern over possible GI bleeding related to NSAID use as well as concern for narcotic misuse. She denies any new fall or injury or any weakness, numbness, or tingling in the extremity. She describes an aching discomfort in the hip. She has been able to ambulate despite discomfort. She also notes that she has been having a dry cough and discomfort with deep breathing for the past week or so. She denies any fevers, chills, lightheadedness or syncope, exertional chest pain, orthopnea, hemoptysis, abdominal or back pain, lower extremity edema or calf tenderness. She denies any known ill contacts. No history of PE or coagulopathy and not currently anticoagulated. .        Review of Systems Constitutional symptoms:  No fever, no chills. Respiratory symptoms:  Shortness of breath, cough, no orthopnea, no hemoptysis, no sputum production, no wheezing. Cardiovascular symptoms:  No chest pain, no palpitations, no syncope, no diaphoresis, no peripheral edema. Gastrointestinal symptoms:  No abdominal pain, no nausea, no vomiting. Musculoskeletal symptoms:  Right hip pain, No back pain,    Neurologic symptoms:  No dizziness, no numbness, no tingling, no focal weakness. Health Status   Allergies: Allergic Reactions (All)  Severe  Talwin- Hypoxia and hyp. Moderate  Latex- Irritates skin. Mild  Codeine- Nausea. Unknown  HYDROcodone- Agitation. TraMADol- Unknown. Canceled/Inactive Reactions (All)  Unknown  Toradol- Unknown. .      Past Medical/ Family/ Social History   Medical history: Reviewed as documented in chart. Surgical history: Reviewed as documented in chart. Family history: Not significant. Social history: Reviewed as documented in chart. Problem list:    Active Problems (8)  Back pain   Cerebral aneurysm   Esophageal stricture   History of fractured vertebra   Hypertension   Motion sickness   MRSA (methicillin resistant Staphylococcus aureus)   Trigeminal neuralgia   , per nurse's notes. Physical Examination               Vital Signs   Vital Signs   8/1/2022 17:19 EDT Respiratory Rate 18 br/min   8/4/5871 68:19 EDT Systolic Blood Pressure 414 mmHg  HI    Diastolic Blood Pressure 84 mmHg    Temperature Oral 37.1 degC    Heart Rate Monitored 73 bpm    Respiratory Rate 20 br/min    SpO2 97 %   . Measurements   8/1/2022 17:17 EDT Body Mass Index est toy 17.63 kg/m2    Body Mass Index Measured 17.63 kg/m2   8/1/2022 17:10 EDT Height/Length Measured 165 cm    Weight Dosing 48 kg   . General:  Alert, no acute distress. Skin:  Warm, dry. Head:  Normocephalic, atraumatic. Neck:  Supple, trachea midline.     Eye:  Extraocular movements are intact, normal conjunctiva. Cardiovascular:  Regular rate and rhythm, Normal peripheral perfusion, No edema. Respiratory:  Lungs are clear to auscultation, respirations are non-labored, breath sounds are equal, Symmetrical chest wall expansion. Chest wall:  No tenderness. Back:  Nontender, Normal range of motion, Normal alignment, no step-offs. Gastrointestinal:  Soft, Nontender. Neurological:  Alert and oriented to person, place, time, and situation, No focal neurological deficit observed, Patient able to lift both legs off the bed without any difficulty. She has some mild tenderness along the lateral aspect of her right hip, but no shortening, external rotation, or obvious bony deformity. Normal neurovascular exam of the extremity with intact sensation throughout the limb and strong pedal pulse. Full active and passive range of motion of all the joints in the right leg. [facility] Psychiatric:  Cooperative, appropriate mood & affect, normal judgment. Medical Decision Making   Differential Diagnosis:  Dehydration, electrolyte imbalance, weakness, pneumonia, viral syndrome. Rationale:  PA/NP reviewed with co-signing physician: ECG, lab results, radiology studies, medication, diagnosis, and plan of care. Documents reviewed:  Emergency department nurses' notes, emergency department records, prior records. Electrocardiogram:  Emergency Provider interpretation performed by me, rate 74, normal sinus rhythm, No ST-T changes, no ectopy, normal WA & QRS intervals.     Results review:  Lab results : Lab View   8/1/2022 18:13 EDT Estimated Creatinine Clearance 69.13 mL/min   8/1/2022 17:48 EDT WBC 6.0 x10e3/mcL    RBC 3.52 x10e6/mcL  LOW    Hgb 11.5 g/dL    HCT 32.8 %  LOW    MCV 93.2 fL    MCH 32.7 pg    MCHC 35.1 g/dL    RDW 13.3 %    Platelet 221 K06Y3/VOC    MPV 8.9 fL    Neutro Auto 67.5 %    Neutro Absolute 4.0 x10e3/mcL    Immature Grans Percent 0.3 %    Immature Grans Absolute 0.02 x10e3/mcL    Lymph Auto 20.9 %    Lymph Absolute 1.3 x10e3/mcL    Mono Auto 9.5 %    Mono Absolute 0.6 x10e3/mcL    Eosinophil Percent 1.3 %    Eos Absolute 0.1 x10e3/mcL    Basophil Auto 0.5 %    Baso Absolute 0.0 x10e3/mcL    Sodium Lvl 120 mmol/L  CRIT    Potassium Lvl 4.8 mmol/L    Chloride 84 mmol/L  LOW    CO2 22 mmol/L    Glucose Random 96 mg/dL    BUN 13 mg/dL    Creatinine Lvl 0.5 mg/dL    AGAP 15 mmol/L    Osmolality Calc 242 mOsm/kg  LOW    Calcium Lvl 9.9 mg/dL    eGFR 95 mL/min/1.73mÂ²    Trop T Quant <0.010 ng/mL    COVID (SARS-CoV-2) (JOHNATHAN) Not Detected    Influenza A (JOHNATHAN) Not Detected    Influenza B (JOHNATHAN) Not Detected   8/1/2022 17:17 EDT Estimated Creatinine Clearance 43.21 mL/min   . Radiology results:  Rad Results (ST)   CT Angiography Chest w/ + w/o Contrast  ?  08/01/22 18:59:03  CT chest with contrast: 08/01/22    INDICATION:PE suspected, high prob. COMPARISON: Chest radiograph same day, CT chest March 10, 2022    TECHNIQUE: PE protocol (Postcontrast imaging from the thoracic inlet through the  hemidiaphragms in the pulmonary arterial phase. Axial 5x5 mm soft tissue and  lung 2x2 mm images. Multiplanar 3-D volumetric MIP reconstructions through the  pulmonary arteries per protocol.) CT scanning was performed using radiation  dose reduction techniques when appropriate, per system protocols. FINDINGS:    Vascular: Evaluation of the pulmonary arteries is diagnostic to the segmental  arterial level. No pulmonary embolism. The main pulmonary artery is normal in  caliber. The aorta is normal in course and caliber. Right middle pulmonary vein  variant anatomy. Mild esophageal wall thickening without discrete measurable  mass, not advanced compared to prior examination. Mediastinum: Stable heart size. No pericardial effusion. Imaged thyroid gland is  unremarkable. Thoracic aorta atherosclerotic calcification. Lymph nodes: No new pathologically enlarged thoracic lymph nodes.     Airways: Central airways are Asymmetric elevation of the right hemidiaphragm. No new focal  consolidation. No pleural effusion. No pneumothorax. Bones/Soft Tissues: Degenerative changes of the spine and right shoulder. IMPRESSION:  Stable chest without new focal consolidation. ?  Signed By: Lorena NASCIMENTO  .   Notes:  81-year-old female with complaint of ongoing right hip pain after surgical repair of hip fracture. She also notes some pleuritic chest discomfort and cough. She is resting comfortably, hemodynamically stable and nontoxic in appearance. Lungs are clear to auscultation bilaterally with adequate O2 saturation and good peripheral perfusion. She has some mild tenderness overlying the lateral right hip, but no obvious bony deformity and a normal neurovascular exam of the extremity. EKG, labs, x-ray of the hip, and CT scan of the chest are reviewed. She has marked hyponatremia which will require hospital admission for correction. Otherwise, no significant abnormalities or any evidence of pulmonary embolus. She does have evidence of chronic bronchitis on her chest imaging which may be the cause of her symptoms. Hardware on her hip x-ray appears intact. She is feeling little better after Tylenol here. We will plan to admit to the hospitalist service for electrolyte correction. It appears she had hypovolemic hyponatremia at hospital stay back in May which was thought to be due to dehydration and poor oral intake. .      Impression and Plan   Diagnosis   Acute hyponatremia (TYB36-SW E87.1, Discharge, Medical)   Chronic bronchitis (HJX30-BO J42, Discharge, Medical)   Pleuritic chest pain (PYQ09-EJ R07.81, Discharge, Medical)   Chronic hip pain (GKX05-JQ M25.559, Discharge, Medical)   Plan   Condition: Stable. Disposition: Admit to Inpatient Unit, [facility]. Counseled: Patient, Regarding diagnosis, Regarding diagnostic results, Regarding treatment plan, Patient indicated understanding of instructions. Signature Line     Electronically Signed on 08/01/2022 08:46 PM EDT   ________________________________________________   Feliberto Gitelman      Electronically Signed on 08/02/2022 01:59 AM EDT   ________________________________________________   [facility]            Modified by: Feliberto Gitelman on 08/01/2022 07:18 PM EDT      Modified by: Feliberto Gitelman on 08/01/2022 07:57 PM EDT      Modified by: Feliberto Gitelman on 08/01/2022 07:59 PM EDT      Modified by: Feliberto Gitelman on 08/01/2022 08:46 PM EDT

## 2022-10-17 NOTE — CASE COMMUNICATION
CM Discharge Planning Assessment - Text       CM Discharge Plan Entered On:  8/5/2022 13:40 EDT    Performed On:  8/5/2022 13:33 EDT by Rohan Brooks CM Discharge Plan   Discharge To :   Family support, [address]   CM Discharge Services :   Home health service   CM Home Health Services Arranged :   Nursing, Occupational Therapy, Physical Therapy, Social Work   CM Discharge Service Agency Selected :   [address] [place], [place] [phone]   CM Choice Reason :   Current patient   Current Equipment and Treatments :   Rolling walker   Community Resources and Special Services :   Substance Abuse Treatment   CM Home/Lay Caregiver Name/Relationship :   Cheri French eddiemayra   CM Home/Lay Caregiver Contact Number :   [phone]    Medicare Patient :   Yes   CM StCM Statusatus :   IP   CM Date/Time Discharge IM :   8/5/2022 13:30 EDT   CM Progress Note :   8/2/22 LBL: [facility] Pt is admitted following a fall at home, had IGNACIA a few weeks ago. Pt recently discharged to [facility]. She spent a few weeks there and returned home about 3 days ago. Pt was discharged from [facility] with [facility]. SHe lives with her hsb, who is not well himeself. Pt has a RW. Pt has a PCP, Dr Sally Wilder and uses [facility] for her medications. Pts step dtr called CM to let her know about her and pts hsb's concern about her opioid use. Reportedly, pt has had issues in the past with abusing opioids. She is [facility] a [facility] at [facility]. Step dtr wants her to go to a substance rehab. CM spoke to pt, she does not want to go to a substance rehab and is willing to keep working with [facility]. PT/OT will assess. CM will follow    8.3.22 jml: Pt may d/c tomorrow. I proposed orders for PT/OT for [facility] (OUTPATIENT CAMP[facility]). IM reviewed w/her. Her  is in the ED currently due to leg pain. She reports he drove himself here and will drive her home at d/c as long as he's able.      8.4.22 jml Referral sent to [facility] (OUTPATIENT CAMPUS).  update: Pt will likely d/c tomorrow. Dr. Aurelia Munoz would like a MSW and RN for [facility] as well so new orders proposed. 8.5.22 cindy: I met with pt; she c/o pain. I talked with her about [facility] vs [facility] Offer for the additional [facility] services. She is in agreement; she did not have a [facility] agency preference. Referral sent to [facility]. IM presented and copy provided. She may d/c later today.      Discharge Planning Assessment Complete :   Yes   Dio Price - 8/5/2022 13:33 EDT

## 2022-10-17 NOTE — CASE COMMUNICATION
CM Discharge Planning Assessment - Text       CM Progress Note Entered On:  8/4/2022 8:26 EDT    Performed On:  8/4/2022 8:25 EDT by Geneva Crews CM Progress Note   CM Home/Lay Caregiver Name/Relationship :   Connor Martines - hsb   CM Home/Lay Caregiver Contact Number :   [phone]   CM Initial Tentative Discharge Plan :   Home with [facility] Alternate Discharge Plan :   SNF   CM Progress Note :   8/2/22 LBL: CM met with pt at [facility]. Pt is admitted following a fall at home, had IGNACIA a few weeks ago. Pt recently discharged to [facility]. She spent a few weeks there and returned home about 3 days ago. Pt was discharged from [facility] with [facility]. SHe lives with her hsb, who is not well himeself. Pt has a RW. Pt has a PCP, Dr Sharmila Ramos and uses [facility] for her medications. Pts step dtr called CM to let her know about her and pts hsb's concern about her opioid use. Reportedly, pt has had issues in the past with abusing opioids. She is currently a pt at the [facility]. Step dtr wants her to go to a substance rehab. CM spoke to pt, she does not want to go to a substance rehab and is willing to keep working with [facility]. PT/OT will assess. CM will follow    8.3.22 jml: Pt may d/c tomorrow. I proposed orders for PT/OT for [facility] (OUTPATIENT CAMP[facility]). IM reviewed w/her. Her  is in the ED currently due to leg pain. She reports he drove himself here and will drive her home at d/c as long as he's able. 8.4.22 Referral sent to [facility] (OUTPATIENT CAMP[facility]).      Geneva Crews - 8/4/2022 8:25 EDT

## 2022-10-17 NOTE — PROGRESS NOTES
Pharmacy Clinical Interventions - Text       Pharmacy Clinical Interventions Entered On:  8/2/2022 13:02 EDT    Performed On:  8/2/2022 13:02 EDT by Kerri Hermosillo Wendy Interventions   Intervention Type Pharmacy :   Optimize monitoring   Associated Order(s) Pharmacy :   enox 30mg for weight < 50kg   Clinical Importance Pharmacy :   Routine safety/clinical monitoring with no change needed   Pharmacy Order Initiated By :   Pharmacist   Medication Safety Reporting Pharmacy :   Non Medication Event   Pharmacy Prescribing Physician :   Dodie AIKEN   Pharmacist Intervention Time :   1-5 Minutes   Kerri Hermosillo, [place] - 8/2/2022 13:02 EDT

## 2022-10-17 NOTE — CASE COMMUNICATION
CM Discharge Planning Assessment - Text       CM Discharge Plan Entered On:  8/4/2022 8:26 EDT    Performed On:  8/4/2022 8:26 EDT by Pratik Dinero               CM Discharge Plan   Discharge To :   Family support, Home Health   CM Discharge Services :   Home health service   CM Home Health Services Arranged :   Occupational Therapy, Physical Therapy   CM Discharge Service Agency Selected : [facility] [phone]   CM Choice Reason :   Current patient   Current Equipment and Treatments :   Rolling walker   Community Resources and Special Services :   Substance Abuse Treatment   CM Home/Lay Caregiver Name/Relationship :   Coty Kendrick - hsb   CM Home/Lay Caregiver Contact Number :   [phone]    Medicare Patient :   Yes   CM StCM Statusatus :   IP   CM Progress Note :   8/2/22 LBL: CM met with pt at [facility]ide. Pt is admitted following a fall at home, had IGNACIA a few weeks ago. Pt recently discharged to [facility]. She spent a few weeks there and returned home about 3 days ago. Pt was discharged from [facility] with [facility]. SHe lives with her hsb, who is not well himeself. Pt has a RW. Pt has a PCP, Dr Jessee Freire and uses [facility] for her medications. Pts step dtr called CM to let her know about her and pts hsb's concern about her opioid use. Reportedly, pt has had issues in the past with abusing opioids. She is currently a pt at the [facility]. Step dtr wants her to go to a substance rehab. CM spoke to pt, she does not want to go to a substance rehab and is willing to keep working with [facility]. PT/OT will assess. CM will follow    8.3.22 [facility]: Pt may d/c tomorrow. I proposed orders for PT/OT for [facility]. IM reviewed w/her. Her  is in the ED currently due to leg pain. She reports he drove himself here and will drive her home at d/c as long as he's able. 8.4.22 [facility] Referral sent to [facility]i.      Discharge Planning Assessment Complete :   Yes   Pratik Dinero - 8/4/2022 8:26 EDT

## 2022-10-17 NOTE — DISCHARGE SUMMARY
ED Clinical Summary                         [facility]  [address], [address] [phone]           PERSON INFORMATION  Name: Agustin Justice Age:  80 Years : 1940   Sex: Female Language: English PCP: Leif Ordoñez   Marital Status:   Phone: [phone] Med Service: MED-Medicine   MRN:  [id number] Acct# [de-identified] Arrival: 2022 21:41:00   Visit Reason: Hip pain-swelling; EMS / [facility] / SX 3WKS AGO Acuity: 3 LOS: 000 04:20   Address:      [address] [place] [address]  Diagnosis:      Acute hyponatremia; Chronic bronchitis; Chronic hip pain; Pleuritic chest pain  Printed Prescriptions: Allergies      Talwin (HYP) (Hypoxia)      codeine (Nausea)      HYDROcodone (Agitation)      Latex (IRRITATES SKIN)      traMADol (Unknown)      Medications Administered During Visit:                  Medication Dose Route   iopamidol 100 mL IV Contrast   Sodium Chloride 0.9% 500 mL IV Piggyback   acetaminophen 650 mg Oral       Patient Medication List:              acetaminophen (Tylenol Regular Strength 325 mg oral tablet) 1 Tabs Oral (given by mouth) 3 times a day. carBAMazepine (carBAMazepine 200 mg oral tablet) TAKE 1/2 (ONE-HALF) TABLET BY MOUTH THREE TIMES DAILY. docusate (Colace) 100 Milligram Oral (given by mouth) 2 times a day.  gabapentin (gabapentin 100 mg oral capsule) 1 Capsules Oral (given by mouth) 3 times a day. lidocaine topical (lidocaine 4% patch) Topical (on the skin) every day. lisinopril (lisinopril 40 mg oral tablet) 1 Tabs Oral (given by mouth) once a day (in the morning). melatonin (Melatonin 3 mg oral tablet) 1 Tabs Oral (given by mouth) Once a Day (at bedtime) as needed. metoclopramide (metoclopramide 10 mg oral tablet) 0.5 Tabs Oral (given by mouth) four times a day (before meals and at bedtime).   metoprolol (metoprolol tartrate 25 mg oral tablet) 0.5 Tabs Oral (given by mouth) 2 times a day.  Misc Medication (ASPIRIN LOW EC 81MG TAB) TAKE 1 TABLET BY MOUTH ONCE DAILY AS DIRECTED.  omeprazole (PriLOSEC 40 mg oral delayed release capsule) 1 Capsules Oral (given by mouth) 2 times a day. ondansetron (Zofran 4 mg oral tablet) 1 Tabs Oral (given by mouth) every 8 hours as needed as needed for nausea/vomiting. traZODone (traZODone 150 mg oral tablet) 1 Tabs Oral (given by mouth) Once a Day (at bedtime). zinc sulfate (zinc sulfate 220 mg oral capsule) 1 Capsules Oral (given by mouth) every day. Major Tests and Procedures: The following procedures and tests were performed during your ED visit.   COMMONPROCEDURES%>  COMMON PROCEDURESCOMMENTS%>          Laboratory Orders  Name Status Details   BMP Completed Blood, Stat, ST - Stat, 08/01/22 17:21:00 EDT, 08/01/22 17:21:00 EDT, Nurse collect, Graham Conway,  [address] EMILY, Print label Y/N   BMP Ordered Blood, Timed Study, TS - Timed Study, 08/01/22 23:30:00 EDT, q6hr 4 hr, 08/02/22 3:59:00 EDT, ANNA RAI, Print label Y/N   BMP Ordered Blood, Timed Study, TS - Timed Study, 08/02/22 0:00:00 EDT, 08/02/22 0:00:00 EDT, ANNA RAI, Print label Y/N   CBCDIFF Completed Blood, Stat, ST - Stat, 08/01/22 17:21:00 EDT, 08/01/22 17:21:00 EDT, Nurse Graham mcbride [address] Print label Y/N   CBCDIFF Ordered Blood, 5 AM Draw, RT - Routine, 08/02/22 5:00:00 EDT, 08/02/22 5:00:00 EDT, ANNA RAI, Print label Y/N   CMP Ordered Blood, 5 AM Draw, RT - Routine, 08/02/22 5:00:00 EDT, 08/02/22 5:00:00 EDT, ANNA RAI, Print label Y/N   [facility] Completed Nasopharyngeal Swab, Stat, ST - Stat, 08/01/22 17:21:00 EDT, 08/01/22 17:21:00 EDT, Nurse collect, Graham Conway,  [address] EMILY, Print label Y/N   Guerrero AM Ordered Blood, 5 AM Draw, RT - Routine, 08/02/22 5:00:00 EDT, 08/02/22 5:00:00 EDT, ANNA RAI, Print label Y/N   Mg Ordered Blood, 5 AM Draw, RT - Routine, 08/02/22 5:00:00 EDT, 08/02/22 5:00:00 EDT, abnormalities, Patient may travel without Telemetry, CM Cardiac Monitoring, Constant order   Cardiac/NIBP/Pulse Ox Monitoring Completed 08/01/22 17:21:00 EDT, This message can only be seen by Nursing, it is not visible to Pharmacy, Laboratory, or Radiology. , 08/01/22 17:21:00 EDT, 08/01/22 17:21:00 EDT, Once   Change attending to Ordered 08/01/22 20:47:36 EDT, Barb Sherman                                                                              ,Physician - Hospitalist   Communication to Nursing Ordered 08/01/22 21:41:00 EDT, RD may manage/modify diet order and/or enteral nutrition per approved MNT protocol, 08/01/22 21:41:00 EDT, 08/01/22 21:41:00 EDT   DC ISO Order/Icons Ordered 08/01/22 18:15:27 EDT, 08/01/22 18:15:27 EDT   ED Assessment Adult Completed 08/01/22 17:17:20 EDT, 08/01/22 17:17:20 EDT   ED Only Oxygen Therapy Completed 08/01/22 17:21:00 EDT, 08/01/22 17:21:00 EDT, Keep SAT > 92%   ED Secondary Triage Completed 08/01/22 17:17:20 EDT, 08/01/22 17:17:20 EDT   ED Tracking - MRSA Ordered 08/01/22 17:05:30 EDT, NOT VALID FOR pharmacy, laboratory, radiology. , 08/01/22 17:05:30 EDT   ED Triage Adult Completed 08/01/22 17:05:30 EDT, 08/01/22 17:05:30 EDT   Inpatient 30 Day Readmission Ordered 08/01/22 17:05:30 EDT, This message can only be seen by Nursing, it is not visible to Pharmacy, Laboratory, or Radiology. , 08/01/22 17:05:30 EDT   Intake and Output Ordered 08/01/22 21:41:00 EDT, 08/01/22 21:41:00 EDT, Per Unit Protocol   Intermittent pneumatic compression devices Ordered 08/01/22 21:44:00 EDT, Constant Order   Medicare Observation ED patient Ordered 08/01/22 20:47:36 EDT, NOT VALID FOR pharmacy, laboratory, radiology. , 08/01/22 20:47:36 EDT   Neurological Checks Ordered 08/02/22 14:00:00 EDT, For the next 24 hours   Neurological Checks Ordered 08/02/22 10:00:00 EDT, For the next 24 hours   Neurological Checks Ordered 08/02/22 6:00:00 EDT, For the next 24 hours   Neurological Checks Ordered 08/02/22 2:00:00 EDT, For the next 24 hours   Neurological Checks Ordered 08/01/22 22:00:00 EDT, For the next 24 hours   Neurological Checks Ordered 08/01/22 21:43:00 EDT, q4hr, For the next 24 hours   Neurological Checks Ordered 08/02/22 18:00:00 EDT, For the next 24 hours   Neurological Checks Ordered 08/03/22 2:00:00 EDT, For the next 24 hours   Neurological Checks Ordered 08/02/22 22:00:00 EDT, For the next 24 hours   Notify Provider Ordered 08/01/22 17:21:58 EDT, This message can only be seen by Nursing, it is not visible to Pharmacy, Laboratory, or Radiology. , 08/01/22 17:21:58 EDT   Notify Rapid Response Team Ordered 08/01/22 21:41:00 EDT, For concerns regarding patient condition & notify MD, 08/01/22 21:41:00 EDT, 08/01/22 21:41:00 EDT   Out of Bed Ordered 08/02/22 0:00:00 EDT, to chair with assist, at all times, further activity per PT/OT recommendations   Out of Bed Ordered 08/03/22 0:00:00 EDT, to chair with assist, at all times, further activity per PT/OT recommendations   Out of Bed Ordered 08/01/22 21:41:00 EDT, QID, to chair with assist, at all times, further activity per PT/OT recommendations   Out of Bed Ordered 08/02/22 6:00:00 EDT, to chair with assist, at all times, further activity per PT/OT recommendations   Out of Bed Ordered 08/02/22 12:00:00 EDT, to chair with assist, at all times, further activity per PT/OT recommendations   Out of Bed Ordered 08/02/22 18:00:00 EDT, to chair with assist, at all times, further activity per PT/OT recommendations   Patient Isolation Ordered 08/01/22 17:21:00 EDT, Contact and Airborne, Constant Indicator   Resuscitation Status Ordered 08/01/22 21:41:00 EDT, Full Resuscitation, Constant Order   Saline Lock Insert Completed 08/01/22 17:21:00 EDT, Once, 08/01/22 17:21:00 EDT   Saline Lock Insert Ordered 08/01/22 21:41:00 EDT, 08/01/22 21:41:00 EDT   Seizure Precautions Ordered 08/01/22 21:43:00 EDT, Constant Order, CM Seizure Precautions   VTE Quality Measures Ordered 08/01/22 21:45:59 EDT, 08/01/22 21:45:59 EDT   Vital Signs Ordered 08/02/22 10:00:00 EDT   Vital Signs Ordered 08/02/22 6:00:00 EDT   Vital Signs Ordered 08/02/22 2:00:00 EDT   Vital Signs Ordered 08/01/22 21:41:00 EDT, q4hr   Vital Signs Ordered 08/01/22 22:00:00 EDT   Vital Signs Ordered 08/02/22 18:00:00 EDT   Vital Signs Ordered 08/02/22 22:00:00 EDT   Vital Signs Ordered 08/03/22 2:00:00 EDT   Vital Signs Ordered 08/02/22 14:00:00 EDT             PROVIDER INFORMATION               Provider Role Assigned Ayaan Vaz [facility] MidLevel 8/1/2022 17:06:46    Halbert Mcardle C-RN ED Nurse 8/1/2022 17:10:27 8/1/2022 19:09:58   Tara Velez, RN, Cristina Gorman ED Nurse 8/1/2022 19:16:19        Attending Physician:  Florinda Orr     Admit Doc  DIVENCEANNA WONG     Consulting Doc  DIVANNA KEATING     VITALS INFORMATION  Vital Sign Triage Latest   Temp Oral ORAL_1%>37.1 degC ORAL%>36.8 degC   Temp Temporal TEMPORAL_1%> TEMPORAL%>   Temp Intravascular INTRAVASCULAR_1%> INTRAVASCULAR%>   Temp Axillary AXILLARY_1%> AXILLARY%>   Temp Rectal RECTAL_1%> RECTAL%>   02 Sat 97 % 100 %   Respiratory Rate RATE_1%>20 br/min RATE%>17 br/min   Peripheral Pulse Rate PULSE RATE_1%>78 bpm PULSE RATE%>78 bpm   Apical Heart Rate HEART RATE_1%> HEART RATE%>   Blood Pressure BLOOD PRESSURE_1%>/ BLOOD PRESSURE_1%>84 mmHg BLOOD PRESSURE%>133 mmHg / BLOOD PRESSURE%>85 mmHg                 Immunizations      No Immunizations Documented This Visit          DISCHARGE INFORMATION   Discharge Disposition: S Outpt-Admitted As Inpatient   Discharge Location:    [facility]   Discharge Date and Time:       ED Checkout Date and Time:    8/1/2022 21:25:00     DEPART REASON INCOMPLETE INFORMATION             Problems      No Problems Documented              Smoking Status      Former smoker, quit more than 30 days ago         PATIENT EDUCATION INFORMATION  Instructions:             Follow up:          ED PROVIDER DOCUMENTATION     Patient:   Sonia Sung             MRN: [id number]            FIN: [id number]               Age:   80 years     Sex:  Female     :  1940   Associated Diagnoses:   Acute hyponatremia; Chronic bronchitis; Pleuritic chest pain; Chronic hip pain   Author:   Eli Burleson      Basic Information   Time seen: Provider Seen (ST)   ED Provider/Time:    Eli Burleson / 2022 17:06  . Additional information: Chief Complaint from Nursing Triage Note   Chief Complaint  Chief Complaint: Patient BIBA c/o right hip pain. Patient reports having hip surgery here approx 2 wks ago, but records show surgery was in May. reports increased pain since yesterday. Has been at [facility] for PT/rehab. Pt also reports \"it hurts to breathe\". (22 17:10:00). History of Present Illness   Patient is a 53-year-old female who presents to the emergency department for evaluation of multiple complaints. She states that she had right hip surgery \"2 weeks ago,\" though records indicate that she had ORIF performed right hip fracture back in May with Dr. Lei Olson. She states she has been having pain persistently since that time despite medications. On review of records, there was some concern over possible GI bleeding related to NSAID use as well as concern for narcotic misuse. She denies any new fall or injury or any weakness, numbness, or tingling in the extremity. She describes an aching discomfort in the hip. She has been able to ambulate despite discomfort. She also notes that she has been having a dry cough and discomfort with deep breathing for the past week or so. She denies any fevers, chills, lightheadedness or syncope, exertional chest pain, orthopnea, hemoptysis, abdominal or back pain, lower extremity edema or calf tenderness. She denies any known ill contacts. No history of PE or coagulopathy and not currently anticoagulated. .        Review of Systems   Constitutional symptoms:  No fever, no chills. Respiratory symptoms:  Shortness of breath, cough, no orthopnea, no hemoptysis, no sputum production, no wheezing. Cardiovascular symptoms:  No chest pain, no palpitations, no syncope, no diaphoresis, no peripheral edema. Gastrointestinal symptoms:  No abdominal pain, no nausea, no vomiting. Musculoskeletal symptoms:  Right hip pain, No back pain,    Neurologic symptoms:  No dizziness, no numbness, no tingling, no focal weakness. Health Status   Allergies: Allergic Reactions (All)  Severe  Talwin- Hypoxia and hyp. Moderate  Latex- Irritates skin. Mild  Codeine- Nausea. Unknown  HYDROcodone- Agitation. TraMADol- Unknown. Canceled/Inactive Reactions (All)  Unknown  Toradol- Unknown. .      Past Medical/ Family/ Social History   Medical history: Reviewed as documented in chart. Surgical history: Reviewed as documented in chart. Family history: Not significant. Social history: Reviewed as documented in chart. Problem list:    Active Problems (8)  Back pain   Cerebral aneurysm   Esophageal stricture   History of fractured vertebra   Hypertension   Motion sickness   MRSA (methicillin resistant Staphylococcus aureus)   Trigeminal neuralgia   , per nurse's notes. Physical Examination               Vital Signs   Vital Signs   8/1/2022 17:19 EDT Respiratory Rate 18 br/min   3/1/0228 20:21 EDT Systolic Blood Pressure 469 mmHg  HI    Diastolic Blood Pressure 84 mmHg    Temperature Oral 37.1 degC    Heart Rate Monitored 73 bpm    Respiratory Rate 20 br/min    SpO2 97 %   . Measurements   8/1/2022 17:17 EDT Body Mass Index est toy 17.63 kg/m2    Body Mass Index Measured 17.63 kg/m2   8/1/2022 17:10 EDT Height/Length Measured 165 cm    Weight Dosing 48 kg   . General:  Alert, no acute distress. Skin:  Warm, dry. Head:  Normocephalic, atraumatic. Neck:  Supple, trachea midline. Eye:  Extraocular movements are intact, normal conjunctiva. Absolute 1.3 x10e3/mcL    Mono Auto 9.5 %    Mono Absolute 0.6 x10e3/mcL    Eosinophil Percent 1.3 %    Eos Absolute 0.1 x10e3/mcL    Basophil Auto 0.5 %    Baso Absolute 0.0 x10e3/mcL    Sodium Lvl 120 mmol/L  CRIT    Potassium Lvl 4.8 mmol/L    Chloride 84 mmol/L  LOW    CO2 22 mmol/L    Glucose Random 96 mg/dL    BUN 13 mg/dL    Creatinine Lvl 0.5 mg/dL    AGAP 15 mmol/L    Osmolality Calc 242 mOsm/kg  LOW    Calcium Lvl 9.9 mg/dL    eGFR 95 mL/min/1.73mÂ²    Trop T Quant <0.010 ng/mL    COVID (SARS-CoV-2) (JOHNATHAN) Not Detected    Influenza A (JOHNATHAN) Not Detected    Influenza B (JOHNATHAN) Not Detected   8/1/2022 17:17 EDT Estimated Creatinine Clearance 43.21 mL/min   . Radiology results:  Rad Results (ST)   CT Angiography Chest w/ + w/o Contrast  ?  08/01/22 18:59:03  CT chest with contrast: 08/01/22    INDICATION:PE suspected, high prob. COMPARISON: Chest radiograph same day, CT chest March 10, 2022    TECHNIQUE: PE protocol (Postcontrast imaging from the thoracic inlet through the  hemidiaphragms in the pulmonary arterial phase. Axial 5x5 mm soft tissue and  lung 2x2 mm images. Multiplanar 3-D volumetric MIP reconstructions through the  pulmonary arteries per protocol.) CT scanning was performed using radiation  dose reduction techniques when appropriate, per system protocols. FINDINGS:    Vascular: Evaluation of the pulmonary arteries is diagnostic to the segmental  arterial level. No pulmonary embolism. The main pulmonary artery is normal in  caliber. The aorta is normal in course and caliber. Right middle pulmonary vein  variant anatomy. Mild esophageal wall thickening without discrete measurable  mass, not advanced compared to prior examination. Mediastinum: Stable heart size. No pericardial effusion. Imaged thyroid gland is  unremarkable. Thoracic aorta atherosclerotic calcification. Lymph nodes: No new pathologically enlarged thoracic lymph nodes. Airways: Central airways are patent.  Bronchial wall thickening. Lungs/Pleura: Asymmetric elevation of the right hemidiaphragm. Biapical  pleural-parenchymal scarring. No focal consolidation. Scattered subsegmental  atelectasis/scarring most conspicuous in the lower lobes. A few regions of  apparent groundglass opacity favored to reflect air trapping in the setting of  expiratory phase imaging, less likely infection or edema. No pleural effusion. No pneumothorax. Upper abdomen: No acute findings. The gallbladder is surgically absent. Hyperdense left superior pole renal lesion, likely proteinaceous/hemorrhagic  cyst.    Bones and soft tissues: Degenerative changes of the spine. Unchanged height loss  of the T5 and T11 vertebral bodies. IMPRESSION:    1. Negative for pulmonary embolism. 2. No focal consolidation. Findings suggestive of chronic bronchitis. 3. Additional findings, as above. ?  Signed By: Jaret NASCIMENTO  ?  **************************************************  XR Hip Complete Right  ?  22 17:50:35  Right hip AP and frog le22    INDICATION:Pain, Joint, Hip, Pelvis or Thigh. COMPARISON: Hip radiographs 2022    FINDINGS/IMPRESSION:    Redemonstrated postsurgical changes status post intramedullary perico and screw  fixation of the previously identified comminuted right trochanteric fracture. Unchanged hardware configuration with stable anatomical alignment and no  evidence of loosening. Interval healing evidenced by increased callus formation. Osteopenia. Degenerative changes of the spine and sacroiliac joints. Phleboliths  the pelvis. Otherwise, unchanged examination. ?  Signed By: Jaret NASCIMENTO  ?  **************************************************  XR Chest 1 View Portable  ?  22 17:55:59  Chest AP: 22    INDICATION:Shortness of breath (SOB). COMPARISON: Chest radiograph May 17, 2022    FINDINGS:    Support Devices: None. Heart/Mediastinum: Stable.     Lungs/Pleura: Asymmetric elevation of the right hemidiaphragm. No new focal  consolidation. No pleural effusion. No pneumothorax. Bones/Soft Tissues: Degenerative changes of the spine and right shoulder. IMPRESSION:  Stable chest without new focal consolidation. ?  Signed By: Alicia NASCIMENTO  .   Notes:  74-year-old female with complaint of ongoing right hip pain after surgical repair of hip fracture. She also notes some pleuritic chest discomfort and cough. She is resting comfortably, hemodynamically stable and nontoxic in appearance. Lungs are clear to auscultation bilaterally with adequate O2 saturation and good peripheral perfusion. She has some mild tenderness overlying the lateral right hip, but no obvious bony deformity and a normal neurovascular exam of the extremity. EKG, labs, x-ray of the hip, and CT scan of the chest are reviewed. She has marked hyponatremia which will require hospital admission for correction. Otherwise, no significant abnormalities or any evidence of pulmonary embolus. She does have evidence of chronic bronchitis on her chest imaging which may be the cause of her symptoms. Hardware on her hip x-ray appears intact. She is feeling little better after Tylenol here. We will plan to admit to the hospitalist service for electrolyte correction. It appears she had hypovolemic hyponatremia at hospital stay back in May which was thought to be due to dehydration and poor oral intake. .      Impression and Plan   Diagnosis   Acute hyponatremia (OLE26-LE E87.1, Discharge, Medical)   Chronic bronchitis (TKR92-XR J42, Discharge, Medical)   Pleuritic chest pain (XUS24-NP R07.81, Discharge, Medical)   Chronic hip pain (FKH94-ET M25.559, Discharge, Medical)   Plan   Condition: Stable. Disposition: Admit to Inpatient Unit, ANNA [facility]I. Counseled: Patient, Regarding diagnosis, Regarding diagnostic results, Regarding treatment plan, Patient indicated understanding of instructions.

## 2022-10-17 NOTE — CASE COMMUNICATION
CM Discharge Planning Assessment - Text       CM Progress Note Entered On:  8/8/2022 11:28 EDT    Performed On:  8/8/2022 11:28 EDT by Zan Garcia CM Progress Note   CM Home/Lay Caregiver Name/Relationship :   Chitra Rodriguez - hsb   CM Home/Lay Caregiver Contact Number :   [phone]   CM Initial Tentative Discharge Plan :   Home with [place]   CM Alternate Discharge Plan :   SNF   CM Progress Note :   8/2/22 LBL: CM met with pt at [facility]. Pt is admitted following a fall at home, had IGNACIA a few weeks ago. Pt recently discharged to [facility]. She spent a few weeks there and returned home about 3 days ago. Pt was discharged from [facility] with [facility] [place]. SHe lives with her hsb, who is not well himeself. Pt has a RW. Pt has a PCP, Dr Natalia Thibodeaux and uses [facility] for her medications. Pts step dtr called CM to let her know about her and pts hsb's concern about her opioid use. Reportedly, pt has had issues in the past with abusing opioids. She is currently a pt at the [facility]. Step dtr wants her to go to a substance rehab. CM spoke to pt, she does not want to go to a substance rehab and is willing to keep working with [facility]. PT/OT will assess. CM will follow    8.3.22 jml: Pt may d/c tomorrow. I proposed orders for PT/OT for [facility] (OUTPATIENT CAMPUS). IM reviewed w/her. Her  is in the ED currently due to leg pain. She reports he drove himself here and will drive her home at d/c as long as he's able. 8.4.22 jml Referral sent to [facility] (OUTPATIENT CAMPUS).  update: Pt will likely d/c tomorrow. Dr. Yeison Lockhart would like a MSW and RN for S[facility] Yates as well so new orders proposed. 8.5.22 jml: I met with pt; she c/o pain. I talked with her about [place] vs [facility] (OUTPATIENT CAMPUS) for the additional [place] services. She is in agreement; she did not have a [facility] agency preference. Referral sent to [facility]. IM presented and copy provided. She may d/c later today. 8.8.22 cindy: Pt may be ready for d/c tomorrow per Dr. Wu Marquez      [facility] - 8/8/2022 11:28 EDT

## 2022-10-17 NOTE — NURSING NOTE
PPD Reading - Text       PPD Reading Entered On:  8/4/2022 17:11 EDT    Performed On:  8/4/2022 17:08 EDT by Fiordaliza Peace RN, Bre'Ana A               PPD Reading   PPD mm of Induration :   0 mm   PPD Interpretation :   Negative   Fiordaliza Peace RN, Bre'Ana A - 8/4/2022 17:08 EDT

## 2022-10-17 NOTE — ED NOTES
ED Pre-Arrival Note        Pre-Arrival Summary    Name:  Raudel Perales,    Current Date:  8/1/2022 17:06:41 EDT  Gender:  Female  Date of Birth:    Age:  80  Pre-Arrival Type:  EMS  ETA:  8/1/2022 17:15:00 EDT  Primary Care Physician:    Presenting Problem:  hip pain/sx 3 wks ago  Pre-Arrival User:  Lucia Narayan RN, Miki Jameson  Referring Source:    Location:  [place]  BP:  148/79  HR:  78  O2:  98            PreArrival Communication Form  Emergency Department        Additional Patient Information:        Orders:  [    ] CBC                                            [     ] CT Head no contrast  [    ] BMP                                           [     ] CT Abdomen/Pelvis no contrast  [    ] PT/INR                                       [     ] CT Abdomen/Pelvis IV contrast, w/ oral contrast  [    ] Troponin                                   [     ] CT Abdomen/Pelvis IV contrast, no oral contrast  [    ] BNP                                            [     ] See ordersheet  [    ] CXR                                             [     ] Other:__________________________  [    ] EKG

## 2022-10-17 NOTE — ED NOTES
----- Message from Basilia Rodriguez sent at 1/17/2020  6:42 PM CST -----  Regarding: Non-Urgent Medical Question  Contact: [phone]  Hi,    I was walking outside tonight, of course in the snow, running to cross the street and fell face forward.  The brunt of my fall was to my knees and elbows but I did feel pressure to my stomach as well.  Some tenderness on my abdomen, but wondering since I’m out of town for the weekend if I need to keep a close eye on anything? Thank you!   ED Triage Note       ED Secondary Triage Entered On:  8/1/2022 17:19 EDT    Performed On:  8/1/2022 17:17 EDT by Bhavana BROWN               General Information   Barriers to Learning :   None evident   ED Home Meds Section :   Document assessment   [facility] ED Fall Risk Section :   Document assessment   ED Advance Directives Section :   Document assessment   ED Palliative Screen :   Document assessment   Bhavana BROWN - 8/1/2022 17:17 EDT   (As Of: 8/1/2022 17:19:06 EDT)   Problems(Active)    Back pain (SNOMED CT  :942581769 )  Name of Problem:   Back pain ; Recorder:   Caren Nageotte, RN, [address] [facility]; Confirmation:   Confirmed ; Classification:   Patient Stated ; Code:   [id number] ; Contributor System:   PowerChart ; Last Updated:   3/8/2022 7:49 EST ; Life Cycle Date:   3/8/2022 ; Life Cycle Status:   Active ; Vocabulary:   SNOMED CT        Cerebral aneurysm (SNOMED CT  :703238808 )  Name of Problem:   Cerebral aneurysm ; Recorder:   JUSTINO Fonseca, Dyan CLEMENTE; Confirmation:   Confirmed ; Classification:   Patient Stated ; Code:   [id number] ; Contributor System:   PowerChart ; Last Updated:   3/16/2022 15:17 EDT ; Life Cycle Status:   Active ; Vocabulary:   SNOMED CT        Esophageal stricture (SNOMED CT  :568183383 )  Name of Problem:   Esophageal stricture ; Recorder:   JUSTINO MAJOR, RIO HAWKINS; Confirmation:   Confirmed ; Classification:   Patient Stated ; Code:   [id number] ; Contributor System:   PowerChart ; Last Updated:   3/16/2022 15:14 EDT ; Life Cycle Date:   3/16/2022 ; Life Cycle Status:   Active ; Vocabulary:   SNOMED CT        History of fractured vertebra (SNOMED CT  :7047437570 )  Name of Problem:   History of fractured vertebra ; Recorder:   Joann Pineda, [address] [facility]; Confirmation:   Confirmed ; Classification:   Patient Stated ; Code:   [id number] ; Contributor System:   PowerChart ; Last Updated:   3/8/2022 7:49 EST ;  Life Cycle Date:   3/8/2022 ; Life Cycle Status:   Active ; Vocabulary:   SNOMED CT Hypertension (SNOMED CT  :3977131544 )  Name of Problem:   Hypertension ; Recorder:   JUSTINO Fonseca, Dyan CLEMENTE; Confirmation:   Confirmed ; Classification:   Patient Stated ; Code:   [id number] ; Contributor System:   Twenga ; Last Updated:   1/27/2021 17:37 EST ; Life Cycle Date:   1/27/2021 ; Life Cycle Status:   Active ; Vocabulary:   SNOMED CT        Motion sickness (IMO  :68451 )  Name of Problem: Motion sickness ; Recorder:   SYSTEM,  SYSTEM; Confirmation:   Confirmed ; Classification:   Patient Stated ; Code:   15443 ; Last Updated:   3/8/2022 7:50 EST ; Life Cycle Date:   3/8/2022 ; Life Cycle Status:   Active ; Vocabulary:   IMO        MRSA (methicillin resistant Staphylococcus aureus) (SNOMED CT  :6225637616 )  Name of Problem:   MRSA (methicillin resistant Staphylococcus aureus) ; Onset Date:   3/18/2022 ; Recorder:   Randolph Emery; Confirmation:   Confirmed ; Classification:   Patient Stated ; Code:   [id number] ; Contributor System:   PowerChart ; Last Updated:   3/22/2022 13:31 EDT ; Life Cycle Date:   3/22/2022 ; Life Cycle Status:   Active ; Vocabulary:   SNOMED CT   ; Comments:        3/22/2022 13:31 - Bhargav Duck,  Aniya Soto  MRSA Alert - nares      Trigeminal neuralgia (SNOMED CT  :67517655 )  Name of Problem:   Trigeminal neuralgia ; Recorder:   Magali Boyd RN, [place]; Confirmation:   Confirmed ; Classification:   Patient Stated ; Code:   [id number] ; Contributor System:   PowerChart ; Last Updated:   4/5/2021 10:43 EDT ; Life Cycle Date:   4/5/2021 ; Life Cycle Status:   Active ; Vocabulary:   SNOMED CT          Diagnoses(Active)    Hip pain-swelling  Date:   8/1/2022 ; Diagnosis Type:   Reason For Visit ; Confirmation:   Complaint of ; Clinical Dx:    Hip pain-swelling ; Classification:   Medical ; Clinical Service:   Emergency medicine ; Code:   PNED ; Probability:   0 ; Diagnosis Code:   [id number]             -    Procedure History   (As Of: 8/1/2022 17:19:06 EDT)     Anesthesia Minutes:   0 ; Procedure Name:   Mastectomy ; Procedure Minutes:   0            Anesthesia Minutes:   0 ; Procedure Name:   Tonsillectomy ; Procedure Minutes:   0            Anesthesia Minutes:   0 ; Procedure Name:   Hysterectomy ; Procedure Minutes:   0            Procedure Dt/Tm:   9/9/2021 12:35:00 EDT ; Location:   [facility] ; Provider:   Andrzej Kruse; Anesthesia Type:   General ; :   Romelia EATON; Anesthesia Minutes:   0 ; Procedure Name:   Danielle Velasquez ; Procedure Minutes:   10 ; Comments:     9/9/2021 13:14 RONEL LINARES RN, PENNE L  auto-populated from documented surgical case ; Clinical Service:   Surgery            Procedure Dt/Tm:   9/9/2021 12:35:00 EDT ; Location:   [facility] ; Provider:   Andrzej Kruse; Anesthesia Type:   General ; :   Romelia EATON; Anesthesia Minutes:   0 ; Procedure Name:   Danielle Velasquez with Biopsy / [place] ; Procedure Minutes:   10 ; Comments:     9/9/2021 13:14 RONEL LINARES RN, PENNE L  auto-populated from documented surgical case ; Clinical Service:   Surgery            Anesthesia Minutes:   0 ; Procedure Name:   Total replacement of right knee joint ; Procedure Minutes:   0            Procedure Dt/Tm:   3/10/2022 09:52:00 EST ; Location:   [facility] ; Provider:   SHERI LUGO; Anesthesia Type:   General ; :   Tania BROOKE; Anesthesia Minutes:   0 ; Procedure Name:   EGD with or without dilatation in [address] [place] [address] ; Procedure Minutes:   27 ; Comments:     3/10/2022 10:46 EST - Robby Gonzalez  auto-populated from documented surgical case ; Clinical Service:   Surgery            Procedure Dt/Tm:   12/2021 ;  Anesthesia Minutes:   0 ; Procedure Name:   Dilatation of esophageal stricture ; Procedure Minutes:   0            Procedure Dt/Tm:   3/15/2022 13:55:00 EDT ; Location:    Endoscopy ; Provider:   Alvin Guardado; Anesthesia Type:   Monitored Anesthesia Care ; :   Zurdo Milian, STELLA; Anesthesia Minutes:   0 ; Procedure Name:   Rolanda Moreno ; Procedure Minutes:   4 ; Comments:     3/15/2022 14:04 EDT - John FERRELL  auto-populated from documented surgical case ; Clinical Service:   Surgery            Procedure Dt/Tm:   3/18/2022 14:28:00 EDT ; Location:    Endoscopy ; Provider:   Chitra LOVE; Anesthesia Type:   Monitored Anesthesia Care ; :   Luigi CALIXTO; Anesthesia Minutes:   0 ; Procedure Name:   Rolanda Moreno ; Procedure Minutes:   4 ; Comments:     3/18/2022 14:35 EDT - Kelley Tello RN, Jignesh Lamas from documented surgical case ; Clinical Service:   Surgery            Procedure Dt/Tm:   5/17/2022 17:58:00 EDT ; Location:   [address] [place] [address] ; Provider:   Brittanie AIKEN; Anesthesia Type:   General ; :   Shelby BRITO; Anesthesia Minutes:   0 ; Procedure Name:   Femur Closed IM Rodding SCIP ; Procedure Minutes:   33 ; Comments:     5/17/2022 19:00 EDT - JUSTINO LINARES PENNE L  auto-populated from documented surgical case ; Clinical Service:   Surgery            Procedure Dt/Tm:   5/17/2022 ;  Anesthesia Minutes:   0 ; Procedure Name:   Right Hip fracture ; Procedure Minutes:   0            Procedure Dt/Tm:   7/16/2022 12:35:00 EDT ; Location:   [facility] ; Provider:   Aparna MACK; Anesthesia Type:   Monitored Anesthesia Care ; :   Minerva BRITO; Anesthesia Minutes:   0 ; Procedure Name:   Rolanda Moreno ; Procedure Minutes:   3 ; Comments:     7/16/2022 12:50 EDT - Renita Peña RN, Jignesh Lamas from documented surgical case ; Clinical Service:   Surgery            ED Advance Directive   Advance Directive :   No   Gokul BROWN - 8/1/2022 17:17 EDT   Palliative Care   Does the Patient have a Life Limiting Illness :   Unable to determine   Gokul BROWN - 8/1/2022 17:17 EDT   Med Hx   Medication List   (As Of: 8/1/2022 17:19:06 EDT)   Home Meds    carBAMazepine Aurelia Fought carBAMazepine ; Status:   Documented ; Ordered As Mnemonic:   carBAMazepine 200 mg oral tablet ; Simple Display Line:   TAKE 1/2 (ONE-HALF) TABLET BY MOUTH THREE TIMES DAILY ; Catalog Code:   carBAMazepine ; Order Dt/Tm:   8/1/2022 17:19:00 EDT          Misc Medication  :   Misc Medication ; Status:   Documented ; Ordered As Mnemonic:   ASPIRIN LOW EC 81MG TAB ; Simple Display Line:   TAKE 1 TABLET BY MOUTH ONCE DAILY AS DIRECTED ; Catalog Code:   Misc Medication ; Order Dt/Tm:   8/1/2022 17:19:00 EDT          metoprolol  :   metoprolol ; Status:   Documented ; Ordered As Mnemonic:   metoprolol tartrate 25 mg oral tablet ; Simple Display Line:   12.5 mg, 0.5 tabs, Oral, BID, 0 Refill(s) ; Ordering Provider:   Bishop Will AMIN; Catalog Code:   metoprolol ; Order Dt/Tm:   7/20/2022 14:45:09 EDT          traZODone  :   traZODone ; Status:   Documented ; Ordered As Mnemonic:   traZODone 150 mg oral tablet ; Simple Display Line:   150 mg, 1 tabs, Oral, Once a Day (at bedtime), 0 Refill(s) ; Ordering Provider:   Bishop Will AMIN; Catalog Code:   traZODone ; Order Dt/Tm:   7/20/2022 14:45:22 EDT          metoclopramide  :   metoclopramide ; Status:   Documented ; Ordered As Mnemonic:   metoclopramide 10 mg oral tablet ; Simple Display Line:   5 mg, 0.5 tabs, Oral, QIDACHS, 0 Refill(s) ; Ordering Provider:   Bishop Will AMIN; Catalog Code:   metoclopramide ; Order Dt/Tm:   7/20/2022 14:44:09 EDT          gabapentin  :   gabapentin ; Status:   Documented ; Ordered As Mnemonic:   gabapentin 100 mg oral capsule ; Simple Display Line:   100 mg, 1 caps, Oral, TID, 0 Refill(s) ; Ordering Provider:   Bishop Will AMIN; Catalog Code:   gabapentin ; Order Dt/Tm:   7/20/2022 14:43:56 EDT          docusate-senna  :   docusate-senna ; Status:   Documented ; Ordered As Mnemonic:   docusate-senna 50 mg-8.6 mg oral tablet ; Simple Display Line:   2 tabs, Oral, BID, 0 Refill(s) ;  Ordering Provider:   Kelly LYN; Catalog Code:   docusate-senna ; Order Dt/Tm:   5/23/2022 13:42:49 EDT          omeprazole  :   omeprazole ; Status:   Documented ; Ordered As Mnemonic:   PriLOSEC 40 mg oral delayed release capsule ; Simple Display Line:   40 mg, 1 caps, Oral, BID, 0 Refill(s) ; Catalog Code:   omeprazole ; Order Dt/Tm:   3/18/2022 15:08:41 EDT          acetaminophen  :   acetaminophen ; Status:   Documented ; Ordered As Mnemonic:   Tylenol Regular Strength 325 mg oral tablet ; Simple Display Line:   325 mg, 1 tabs, Oral, q4hr, PRN: prn, 0 Refill(s) ; Catalog Code:   acetaminophen ; Order Dt/Tm:   3/15/2022 16:26:12 EDT          ondansetron  :   ondansetron ; Status:   Documented ; Ordered As Mnemonic:   Zofran 4 mg oral tablet ; Simple Display Line:   4 mg, 1 tabs, Oral, q8hr, PRN: as needed for nausea/vomiting, 0 Refill(s) ; Catalog Code:   ondansetron ; Order Dt/Tm:   3/15/2022 16:26:33 EDT          lisinopril  :   lisinopril ; Status:   Documented ; Ordered As Mnemonic:   lisinopril 40 mg oral tablet ; Simple Display Line:   40 mg, 1 tabs, Oral, qAM, 0 Refill(s) ;  Catalog Code:   lisinopril ; Order Dt/Tm:   5/26/2021 11:10:13 EDT

## 2022-10-29 NOTE — CODE DOCUMENTATION
Rapid Response Team Note               RRT called to evaluate c/o chest pain 7/10 on pain scale. Mid-substernal, non-radiating. Pt. describes pain squeezing in nature. Skin warm and dry to touch. No SOB noted. Lung fields decreased bilaterally. Pt. placed on O2 @ 2L via NC. STAT EKG, Trop, BMP, and PCXR ordered. /93. Pt. medicated with Ntg. SL (0.4 mg) 1 tab X doses 5 min. apart. BP 95/55. Chest pain decreased to 6/10. Troponin levels negative. Pt. with high anxiety noted. Emotional support provided. Pt. placed on Telemetry. RRT will continue to monitor. RRT will be notified RE: patient status changes. Pt. Has been medicated with 30 cc of MOM and ordered Trazodone as per MAR. Signature Line     Addendum by Ginny VENTURA on August 05, 2022 16:41 EDT               RRT f/u:  Patient continues to express epigastric discomfort, denies SOB and difficulty breathing. Nurse at bedside giving her evening medications, offered heating pack which helped with discomfort earlier.

## 2022-10-29 NOTE — PROGRESS NOTES
Inpatient OT Evaluation - Text       Inpatient OT Evaluation Entered On:  8/2/2022 13:39 EDT    Performed On:  8/2/2022 13:30 EDT by Nain PAYTON               Reason for Treatment   *Reason for Referral :   Pt is a 79 y/o female to ED with c/o R hip pain. Pt admitted with acute hyponatremia, generalized weakness. PMH:  anemia, hyponatremia, insomnia, chronic back pain, constipation, HTN, GERD, cerebral aneurysm s/p repair, trigeminal neuralgia, MRSA, motion sickness, R femur vx 5/17/22 s/p cm nailing         [facility] - 8/2/2022 13:58 EDT   Subjective Statement :   RN stated pt okay for treat and pt agreeable, pt's  in room. [facility] - 8/2/2022 13:50 EDT   General Information   Occupational Therapy Orders :   OT Evaluation and Treatment Inpatient Acute - 05/15/22 4:26:00 EDT, Stop date 05/15/22 4:26:00 EDT     Precautions RTF :   Precaution Orders  Seizure Precautions - Ordered    -- 08/01/22 21:43:00 EDT, Constant Order     Pain Present :   No actual or suspected pain   Orientation Assessment :   Oriented x 4   Affect/Behavior :   Appropriate   [facility] - 8/2/2022 13:50 EDT   Problem List   (As Of: 8/2/2022 14:05:23 EDT)   Problems(Active)    Back pain (SNOMED CT  :561591579 )  Name of Problem:   Back pain ; Recorder:   Fartun Pelletier RN, [address] [place] [address]; Confirmation:   Confirmed ; Classification:   Patient Stated ; Code:   [id number] ; Contributor System:   PowerChart ; Last Updated:   3/8/2022 7:49 EST ; Life Cycle Date:   3/8/2022 ; Life Cycle Status:   Active ; Vocabulary:   SNOMED CT        Cerebral aneurysm (SNOMED CT  :075547443 )  Name of Problem:   Cerebral aneurysm ; Recorder:   JUSTINO Fonseca Tiffany A; Confirmation:   Confirmed ; Classification:   Patient Stated ; Code:   [id number] ; Contributor System:   PowerChart ; Last Updated:   3/16/2022 15:17 EDT ;  Life Cycle Status:   Active ; Vocabulary:   SNOMED CT        Esophageal stricture (SNOMED CT  :145870305 )  Name of Problem:   Esophageal stricture ; Recorder:   JUSTINO MAJOR, RIO HAWKINS; Confirmation:   Confirmed ; Classification:   Patient Stated ; Code:   [id number] ; Contributor System:   PowerChart ; Last Updated:   3/16/2022 15:14 EDT ; Life Cycle Date:   3/16/2022 ; Life Cycle Status:   Active ; Vocabulary:   SNOMED CT        History of fractured vertebra (SNOMED CT  :3152925359 )  Name of Problem:   History of fractured vertebra ; Recorder:   Radha Velez, [address] [place] [address]; Confirmation:   Confirmed ; Classification:   Patient Stated ; Code:   [id number] ; Contributor System:   PowerChart ; Last Updated:   3/8/2022 7:49 EST ; Life Cycle Date:   3/8/2022 ; Life Cycle Status:   Active ; Vocabulary:   SNOMED CT        Hypertension (SNOMED CT  :0902738313 )  Name of Problem:   Hypertension ; Recorder:   JUSTINO Fonseca, Dyan CLEMENTE; Confirmation:   Confirmed ; Classification:   Patient Stated ; Code:   [id number] ; Contributor System:   PowerChart ; Last Updated:   1/27/2021 17:37 EST ; Life Cycle Date:   1/27/2021 ; Life Cycle Status:   Active ; Vocabulary:   SNOMED CT        Motion sickness (IMO  :80394 )  Name of Problem: Motion sickness ; Recorder:   SYSTEM,  SYSTEM; Confirmation:   Confirmed ; Classification:   Patient Stated ; Code:   19202 ; Last Updated:   3/8/2022 7:50 EST ; Life Cycle Date:   3/8/2022 ; Life Cycle Status:   Active ; Vocabulary:   IMO        MRSA (methicillin resistant Staphylococcus aureus) (SNOMED CT  :1385538635 )  Name of Problem:   MRSA (methicillin resistant Staphylococcus aureus) ; Onset Date:   3/18/2022 ; Recorder:   Jagdish Vargas; Confirmation:   Confirmed ; Classification:   Patient Stated ; Code:   8856083567 ; Contributor System:   PowerChart ; Last Updated:   3/22/2022 13:31 EDT ;  Life Cycle Date:   3/22/2022 ; Life Cycle Status:   Active ; Vocabulary:   SNOMED CT   ; Comments:        3/22/2022 13:31 - Jagdish Vargas  MRSA Alert - nares      Trigeminal neuralgia (SNOMED CT  :71113537 )  Name of Problem:   Trigeminal neuralgia ; Recorder:   Anette Hyde RN, [facility]; Confirmation:   Confirmed ; Classification:   Patient Stated ; Code:   [id number] ; Contributor System:   PowerChart ; Last Updated:   4/5/2021 10:43 EDT ; Life Cycle Date:   4/5/2021 ; Life Cycle Status:   Active ; Vocabulary:   SNOMED CT          Diagnoses(Active)    Acute hyponatremia  Date:   8/1/2022 ; Diagnosis Type:   Discharge ; Confirmation:   Confirmed ; Clinical Dx:   Acute hyponatremia ; Classification:   Medical ; Clinical Service:   Non-Specified ; Code:   ICD-10-CM ; Probability:   0 ; Diagnosis Code:   E87.1      Chronic hip pain  Date:   8/1/2022 ; Diagnosis Type:   Discharge ; Confirmation:   Confirmed ; Clinical Dx:   Chronic hip pain ; Classification:   Medical ; Clinical Service:   Non-Specified ; Code:   ICD-10-CM ; Probability:   0 ; Diagnosis Code:   M25.559      Gastroparesis  Date:   8/1/2022 ; Diagnosis Type:   Discharge ; Confirmation:   Confirmed ; Clinical Dx:   Gastroparesis ; Classification:   Medical ; Clinical Service:   Non-Specified ; Code:   ICD-10-CM ; Probability:   0 ; Diagnosis Code:   K31.84      Generalized weakness  Date:   8/1/2022 ; Diagnosis Type:   Discharge ; Confirmation:   Confirmed ; Clinical Dx:   Generalized weakness ; Classification:   Medical ; Clinical Service:   Non-Specified ; Code:   ICD-10-CM ; Probability:   0 ; Diagnosis Code:   R53.1      History of GI bleed  Date:   8/1/2022 ; Diagnosis Type:   Discharge ; Confirmation:   Confirmed ; Clinical Dx:   History of GI bleed ; Classification:   Medical ; Clinical Service:   Non-Specified ; Code:   ICD-10-CM ; Probability:   0 ; Diagnosis Code:   Z87.19      Hypertension  Date:   8/1/2022 ; Diagnosis Type:   Discharge ; Confirmation:   Confirmed ; Clinical Dx:   Hypertension ; Classification:   Patient Stated ; Clinical Service:   Non-Specified ; Code:   ICD-10-CM ;  Probability:   0 ; Diagnosis Code:   I10      Insomnia  Date:   8/1/2022 ; Diagnosis Type:   Discharge ; Confirmation:   Confirmed ; Clinical Dx: Insomnia ; Classification:   Medical ; Clinical Service:   Non-Specified ; Code:   ICD-10-CM ; Probability:   0 ; Diagnosis Code:   G47.00      Muscle weakness (generalized)  Date:   8/2/2022 ; Diagnosis Type: Other ; Confirmation:   Differential ; Clinical Dx:   Muscle weakness (generalized) ; Classification:   Interdisciplinary ; Clinical Service:   Non-Specified ; Code:   ICD-10-CM ; Probability:   0 ; Diagnosis Code:   M62.81      Normocytic anemia  Date:   8/1/2022 ; Diagnosis Type:   Discharge ; Confirmation:   Confirmed ; Clinical Dx:   Normocytic anemia ; Classification:   Medical ; Clinical Service:   Non-Specified ; Code:   ICD-10-CM ; Probability:   0 ; Diagnosis Code:   D64.9      Trigeminal neuralgia  Date:   8/1/2022 ; Diagnosis Type:   Discharge ; Confirmation:   Confirmed ; Clinical Dx:   Trigeminal neuralgia ; Classification:   Patient Stated ; Clinical Service:   Non-Specified ; Code:   ICD-10-CM ; Probability:   0 ; Diagnosis Code:   G50.0        Home Environment   Living Environment :   Home Environment  Sensory Deficits:  None, Other: Prescription glasses  Performed By:  Radha Mosley 08/02/2022     Living Situation :   Home with family support   Lives With :   Spouse   Lives In :   Single level home   [facility] 8/2/2022 13:50 EDT   Rehabilitation Stairs Grid     Outside Stairs          Number of Stairs :    [address] [facility] 8/2/2022 13:50 EDT         Patient's Responsibilities Rehab :   Meal preparation, Personal ADL   Detail Areas of Responsibilities :   Has someone in 2x/month for cleaning   Sandhya Venturas - 8/2/2022 13:50 EDT   Home Environment II   Living Environment :   Home Environment  Sensory Deficits:  None, Other: Prescription glasses  Performed By:  Radha Mosley 08/02/2022     Devices/Equipment at Home :   Commode - Elevated, Grab bars, Walker - Shoulder Flexion :   4   Shoulder Abduction :   4-   Shoulder Adduction :   4   Elbow Flexion :   4   Elbow Extension :   4   Finger Flexion :   4   [facility] - 8/2/2022 13:58 EDT   Right Upper Extremity Strength Grid   Shoulder Flexion :   4   Shoulder Abduction :   4-   Shoulder Adduction :   4   Elbow Flexion :   4   Elbow Extension :   4   Finger Flexion :   4   [facility] - 8/2/2022 13:58 EDT   UE Function   Previous Hand Dominance :   Right   Current Hand Dominance :   Right   [facility] - 8/2/2022 13:50 EDT   UE Coordination   Left :   24.11   Right :   22.49   [facility] - 8/2/2022 13:58 EDT   Left Upper Extremity Coordination Grid   Finger Opposition :   Within functional limits   [facility] - 8/2/2022 13:58 EDT   Right Upper Extremty Coordination Grid   Finger Opposition :   Within functional limits   [facility] - 8/2/2022 13:58 EDT   Education   Home Caregiver Name/Relationship :   Ale Koyanagi -    Teaching Method :   Explanation   [facility] - 8/2/2022 13:58 EDT   Occupational Therapy Education Grid   Plan of Care :   Verbalizes understanding   [facility] - 8/2/2022 13:58 EDT   Long Term Goals   OT LT Goals Reviewed :   Tanesha Ruiz OT, [facility] - 8/2/2022 13:58 EDT   Short Term Goals   OT ST Goals Reviewed :   Yes   [facility] - 8/2/2022 13:58 EDT   Plan   OT Evaluation Date :   8/2/2022 EDT   Treatment Plan/Goals Established With Patient/Caregiver :   Yes   OT Evaluation Complete :   Yes   Plan of Care Comments :   No acute OT needs at this time     [facility] - 8/2/2022 13:58 EDT   Time Spent With Patient   OT Time Out :   13:51 EST   OT Individual Eval Time, Moderate Complexity :   10 minutes   OT Evaluation Units, Moderate Complexity :   1 Unit   OT FUNCTIONAL TRNG 15 MIN :   1 units   OT Functional Training Time :   11 minutes   OT Total Timed Code Treatment Units :   1 units   OT Total Timed Code Treatment Minutes :   11 minutes   OT Total Untimed Code Treatment Minutes :   10 minutes   OT Total Treatment Time :   21 minutes   [facility] 8/2/2022 13:58 EDT   OT Time In :   13:30 EST   [facility] 8/2/2022 13:49 EDT   AM-PAC Daily Activity   AM-PAC Daily Activity   Putting on and taking off regular lower body clothing? :   A little = 3 points   Bathing (including washing, rinsing, drying)? :   A little = 3 points   Toileting , which includes using toilet, bedpan or urinal? :   A little = 3 points   Putting on and taking off regular upper body clothing? :   A little = 3 points   Taking care of personal grooming such as brushing teeth? :   A little = 3 points   Eating meals? :   None = 4 points   [facility] 8/2/2022 13:58 EDT   AM-PAC Daily Activity Raw Score :   19    [facility] 8/2/2022 13:58 EDT   Assessment   OT Impairments or Limitations :   Basic activity of daily living deficits, Strength deficits   OT Discharge Recommendations :   Resume [place] rehab OT/PT. OT Treatment Recommendations :   Pt semi-supine in bed upon therapist arrival, pt states no pain at this time. Pt transitioned to sitting edge of bed mod I with use of bedrail. Pt able to sit unsupported edge of bed. B UE testing completed, B UE AROM WFL and strength grossly 4 to 4-/5. Pt able to doff socks but unable to don, reports not wearing socks at home. Pt completed sit to stand and stand turn transfer to [facility] with supv, no assistive device. Following toileting pt did require assist for hygiene and reports that  has had to assist in this area recently. Pt returned to sitting edge of bed, declined grooming at this time but has items in place to complete. Pt returned to supine independently. At end of session pt semi-supine in bed with call bell in place and nursing in room. No further acute OT needs at this time.      [facility] 8/2/2022 13:58 EDT

## 2022-10-29 NOTE — CASE COMMUNICATION
Face to Face Encounter Document - Text       Face to Face Encounter Documentation Entered On:  8/3/2022 19:22 EDT    Performed On:  8/3/2022 19:22 EDT by Raegan AIKEN               Face to Face Encounter   Sergio Yates Certification Statement :   I had a face to face encounter with this patient that meets the provider face to face requirements, As the ordering provider, I attest to the evaluation, assessment, treatment and follow-up in relation to both the patient and to the home health services ordered.    [place] Skilled Nursing Needs :   Pain management & teaching, Disease management & teaching, Medication management & teaching    Type of Therapy Service Needed :   PT, OT   [place] Reason for Therapy Service :   Gait instability, fall risk, decreased mobility or ROM, Functional limitations    Reason Patient is Homebound :   Leaving home requires a considerable & taxing effort, Absences from home are to receive healthcare treatment & are infrequent   Raegan AIKEN - 8/3/2022 19:22 EDT

## 2022-10-29 NOTE — CASE COMMUNICATION
Face to Face Encounter Document - Text       Face to Face Encounter Documentation Entered On:  8/5/2022 8:16 EDT    Performed On:  8/5/2022 8:15 EDT by Abad AIKEN               Face to Face Encounter   [facility] Skilled Nursing Needs :   Pain management & teaching, Disease management & teaching, Medication management & teaching, Monitoring of laboratory values   HH Type of Therapy Service Needed :   PT, OT   [facility] Reason for Therapy Service :   Gait instability, fall risk, decreased mobility or ROM, Functional limitations   HH Reason Patient is Homebound :   Leaving home requires a considerable & taxing effort, Absences from home are to receive healthcare treatment & are infrequent   Abad AIKEN - 8/5/2022 8:15 EDT

## 2022-10-29 NOTE — NURSING NOTE
Adult Patient History Form-Text       Adult Patient History Entered On:  8/1/2022 22:17 EDT    Performed On:  8/1/2022 22:11 EDT by Melania CORONA               General Info   Patient Identified :   Identification band   Patient Identified :   Shonda Cruz   Information Given By :   Self   Preferred Mode of Communication :   Verbal, Written   Pregnancy Status :   N/A   In Charge of News (ICON) Code :   9888   Has the patient received chemotherapy or immunotherapy (cytotoxic)  in the last 48-72 hours? :   No   In Clinical Trial With Signed Consent for Related Condition :   No signed consent for clinical trial   Is the patient currently (2-3 days) receiving radiation treatment? :   No   Melania CORONA - 8/1/2022 22:11 EDT   Allergies   (As Of: 8/1/2022 22:17:06 EDT)   Allergies (Active)   codeine  Estimated Onset Date:   Unspecified ; Reactions:   Nausea ; Created By:   Terra Westbrook RN, Thuy Be; Reaction Status:   Active ; Category:   Drug ; Substance:   codeine ; Type: Allergy ; Severity:   Mild ; Updated By:   Taryn Vazquez; Reviewed Date:   8/1/2022 17:23 EDT      HYDROcodone  Estimated Onset Date:   Unspecified ; Reactions:   Agitation ; Created By:   Jessica Devine; Reaction Status:   Active ; Category:   Drug ; Substance:   HYDROcodone ; Type: Allergy ; Severity:   Unknown ; Updated By:   Jessica Devine; Reviewed Date:   8/1/2022 17:23 EDT      Latex  Estimated Onset Date:   Unspecified ; Reactions:   IRRITATES SKIN ; Created By:   Trino Yi RN, [facility]; Reaction Status:   Active ; Category:   Drug ; Substance:   Latex ; Type: Allergy ; Severity:   Moderate ; Updated By:   Kacey Javier; Reviewed Date:   8/1/2022 17:23 EDT      Talwin  Estimated Onset Date:   Unspecified ; Reactions:   Hypoxia, HYP ; Created By:   Rose Camarillo; Reaction Status:   Active ; Category:   Drug ; Substance: Talwin ; Type: Allergy ; Severity:   Severe ; Updated By:   Rose Camarillo;  Reviewed Date: 8/1/2022 17:23 EDT      traMADol  Estimated Onset Date:   Unspecified ; Reactions:   Unknown ; Created By:   Petrona BARAKAT; Reaction Status:   Active ; Category:   Drug ; Substance:   traMADol ; Type: Allergy ; Severity:   Unknown ; Updated By:   Petrona BARAKAT; Reviewed Date:   8/1/2022 17:23 EDT        Medication History   Medication List   (As Of: 8/1/2022 22:17:06 EDT)   Normal Order    docusate sodium 100 mg Cap  :   docusate sodium 100 mg Cap ; Status:   Ordered ; Ordered As Mnemonic:   Colace ; Simple Display Line:   100 mg, 1 caps, Oral, BID ; Ordering Provider:   Gurvinder Saldana; Catalog Code:   docusate ; Order Dt/Tm:   8/1/2022 21:46:34 EDT          lidocaine topical  :   lidocaine topical ; Status:   Voided ; Ordered As Mnemonic:   lidocaine 4% patch ; Simple Display Line:   1 patches, Topical, Daily ; Ordering Provider:   Gurvinder Saldana; Catalog Code:   lidocaine topical ; Order Dt/Tm:   8/1/2022 21:46:56 EDT          lisinopril 20 mg Tab  :   lisinopril 20 mg Tab ; Status:   Ordered ; Ordered As Mnemonic:   lisinopril ; Simple Display Line:   40 mg, 2 tabs, Oral, qAM ; Ordering Provider:   ANNA Hernandez; Catalog Code:   lisinopril ; Order Dt/Tm:   8/1/2022 21:47:06 EDT          metoprolol tartrate 25 mg Tab  :   metoprolol tartrate 25 mg Tab ; Status:   Ordered ; Ordered As Mnemonic:   metoprolol tartrate 25 mg oral tablet ; Simple Display Line:   12.5 mg, 0.5 tabs, Oral, BID ; Ordering Provider:   Gurvinder Saldana; Catalog Code:   metoprolol ; Order Dt/Tm:   8/1/2022 21:47:41 EDT          zinc sulfate 220 mg Cap (50mg elemental zinc)  :   zinc sulfate 220 mg Cap (50mg elemental zinc) ; Status:   Ordered ; Ordered As Mnemonic:   zinc sulfate ; Simple Display Line:   220 mg, 1 caps, Oral, Daily ;  Ordering Provider:   Gurvinder Saldana; Catalog Code:   zinc sulfate ; Order Dt/Tm:   8/1/2022 21:48:08 EDT          omeprazole 20 mg DR Cap  :   omeprazole 20 mg DR Petersen ; Status:   Ordered ; Ordered As Mnemonic:   PriLOSEC ; Simple Display Line:   40 mg, 2 caps, Oral, BID ; Ordering Provider:   Rox Ohara; Catalog Code:   omeprazole ; Order Dt/Tm:   8/1/2022 21:47:58 EDT          carBAMazepine 200 mg Tab  :   carBAMazepine 200 mg Tab ; Status:   Ordered ; Ordered As Mnemonic:   carBAMazepine ; Simple Display Line:   100 mg, 0.5 tabs, Oral, TID ; Ordering Provider:   Rox Ohara; Catalog Code:   carBAMazepine ; Order Dt/Tm:   8/1/2022 21:51:52 EDT ; Comment:   Comments: ---  At home, patient was taking medication with the following details:  Special Instructions: TAKE 1/2 (ONE-HALF) TABLET BY MOUTH THREE TIMES DAILY  ---          carBAMazepine  :   carBAMazepine ; Status:   Voided ; Ordered As Mnemonic:   carBAMazepine 200 mg oral tablet ; Simple Display Line:   0.5 tabs, Oral, TID ; Ordering Provider:   Rox Ohara; Catalog Code:   carBAMazepine ; Order Dt/Tm:   8/1/2022 21:46:13 EDT ; Comment:   ---  At home, patient was taking medication with the following details:  Special Instructions: TAKE 1/2 (ONE-HALF) TABLET BY MOUTH THREE TIMES DAILY  ---          gabapentin 100 mg Cap  :   gabapentin 100 mg Cap ; Status:   Ordered ; Ordered As Mnemonic:   gabapentin ; Simple Display Line:   100 mg, 1 caps, Oral, TID ; Ordering Provider:   Rox Ohara; Catalog Code:   gabapentin ; Order Dt/Tm:   8/1/2022 21:46:51 EDT          lidocaine 5% Topical Film  :   lidocaine 5% Topical Film ; Status:   Ordered ; Ordered As Mnemonic:   lidocaine 5% topical film ; Simple Display Line:   1 patches, Topical, q24hr ; Ordering Provider:   Rox Ohara; Catalog Code:   lidocaine topical ; Order Dt/Tm:   8/1/2022 21:52:28 EDT ; Comment:   Apply patch to intact skin. Patch may remain in place for up to 12 hours in any 24-hour period.   12 HOURS ON THEN 12 HOURS OFF          metoclopramide 5 mg Tab  :   metoclopramide 5 mg Tab ; Status:   Ordered ; Ordered As Mnemonic:   metoclopramide ; Simple Display Line:   5 mg, 1 tabs, Oral, QIDACHS ; Ordering Provider:   Miranda Retana; Catalog Code:   metoclopramide ; Order Dt/Tm:   8/1/2022 21:47:39 EDT          traZODone 150 mg Tab  :   traZODone 150 mg Tab ; Status:   Ordered ; Ordered As Mnemonic:   traZODone ; Simple Display Line:   150 mg, 1 tabs, Oral, Once a Day (at bedtime) ; Ordering Provider:   Miranda Retana; Catalog Code:   traZODone ; Order Dt/Tm:   8/1/2022 21:48:06 EDT          ondansetron 4 mg Tab  :   ondansetron 4 mg Tab ; Status:   Ordered ; Ordered As Mnemonic:   Zofran ; Simple Display Line:   4 mg, 1 tabs, Oral, q8hr, PRN: nausea/vomiting ; Ordering Provider:   ANNA Araujo; Catalog Code:   ondansetron ; Order Dt/Tm:   8/1/2022 21:48:02 EDT          melatonin 3 mg Tab  :   melatonin 3 mg Tab ; Status:   Ordered ; Ordered As Mnemonic:   melatonin ; Simple Display Line:   3 mg, 1 tabs, Oral, Once a Day (at bedtime), PRN: insomnia ; Ordering Provider:   Miranda Retana; Catalog Code:   melatonin ; Order Dt/Tm:   8/1/2022 21:47:18 EDT          acetaminophen 325 mg Tab  :   acetaminophen 325 mg Tab ; Status:   Ordered ; Ordered As Mnemonic:   acetaminophen ; Simple Display Line:   650 mg, 2 tabs, Oral, q4hr, PRN: other (see comment) ; Ordering Provider:   Miranda Retana; Catalog Code:   acetaminophen ; Order Dt/Tm:   8/1/2022 21:43:41 EDT          aluminum hydroxide/magnesium hydroxide/simethicone 200 mg-200 mg-20 mg/5 mL  :   aluminum hydroxide/magnesium hydroxide/simethicone 200 mg-200 mg-20 mg/5 mL ; Status:   Ordered ; Ordered As Mnemonic:   aluminum hydroxide/magnesium hydroxide/simethicone 200 mg-200 mg-20 mg/5 mL oral suspension ; Simple Display Line:   30 mL, Oral, q3hr, PRN: indigestion ; Ordering Provider:   Miranda Retana; Catalog Code:    Al hydroxide/Mg hydroxide/simethicone ; Order Dt/Tm:   8/1/2022 21:43:43 EDT          acetaminophen 325 mg Tab  :   acetaminophen 325 mg Tab ; Status:   Completed ; Ordered As Mnemonic:   Tylenol ; Simple Display Line:   650 mg, 2 tabs, Oral, Once ; Ordering Provider:   Isha Pisano; Catalog Code:   acetaminophen ; Order Dt/Tm:   8/1/2022 18:25:16 EDT          Sodium Chloride 0.9% intravenous solution Bolus  :   Sodium Chloride 0.9% intravenous solution Bolus ; Status:   Completed ; Ordered As Mnemonic:   Sodium Chloride 0.9% bolus ; Simple Display Line:   500 mL, 500 mL/hr, IV Piggyback, Once ; Ordering Provider:   Isha Pisano; Catalog Code:   Sodium Chloride 0.9% ; Order Dt/Tm:   8/1/2022 18:25:16 EDT          iopamidol 76% Inj Soln 100 mL  :   iopamidol 76% Inj Soln 100 mL ; Status:   Completed ; Ordered As Mnemonic:   Isovue-370 ; Simple Display Line:   100 mL, IV Contrast, Once ; Ordering Provider:   Isha Pisano; Catalog Code:   iopamidol ; Order Dt/Tm:   8/1/2022 18:15:33 EDT            Home Meds    melatonin  :   melatonin ; Status:   Documented ; Ordered As Mnemonic:   Melatonin 3 mg oral tablet ; Simple Display Line:   3 mg, 1 tabs, Oral, Once a Day (at bedtime), PRN, 60 tabs, 0 Refill(s) ; Catalog Code:   melatonin ; Order Dt/Tm:   8/1/2022 19:55:21 EDT          zinc sulfate  :   zinc sulfate ; Status:   Documented ; Ordered As Mnemonic:   zinc sulfate 220 mg oral capsule ; Simple Display Line:   220 mg, 1 caps, Oral, Daily, 100 caps, 0 Refill(s) ; Catalog Code:   zinc sulfate ; Order Dt/Tm:   8/1/2022 19:55:36 EDT          lidocaine topical  :   lidocaine topical ; Status:   Documented ; Ordered As Mnemonic:   lidocaine 4% patch ; Simple Display Line:   Topical, Daily, 0 Refill(s) ; Catalog Code:   lidocaine topical ; Order Dt/Tm:   8/1/2022 19:54:45 EDT          docusate  :   docusate ; Status:   Documented ; Ordered As Mnemonic:   Colace ; Simple Display Line:   100 mg, Oral, BID, 0 Refill(s) ;  Catalog Code:   docusate ; Order Dt/Tm:   8/1/2022 19:53:48 EDT          carBAMazepine Jenifer Leal carBAMazepine ; Status:   Documented ; Ordered As Mnemonic:   carBAMazepine 200 mg oral tablet ; Simple Display Line:   TAKE 1/2 (ONE-HALF) TABLET BY MOUTH THREE TIMES DAILY ; Catalog Code:   carBAMazepine ; Order Dt/Tm:   8/1/2022 17:19:00 EDT          Misc Medication  :   Misc Medication ; Status:   Documented ; Ordered As Mnemonic:   ASPIRIN LOW EC 81MG TAB ; Simple Display Line:   TAKE 1 TABLET BY MOUTH ONCE DAILY AS DIRECTED ; Catalog Code:   Misc Medication ; Order Dt/Tm:   8/1/2022 17:19:00 EDT          metoprolol  :   metoprolol ; Status:   Documented ; Ordered As Mnemonic:   metoprolol tartrate 25 mg oral tablet ; Simple Display Line:   12.5 mg, 0.5 tabs, Oral, BID, 0 Refill(s) ; Ordering Provider:   Nasima AMIN; Catalog Code:   metoprolol ; Order Dt/Tm:   7/20/2022 14:45:09 EDT          traZODone  :   traZODone ; Status:   Documented ; Ordered As Mnemonic:   traZODone 150 mg oral tablet ; Simple Display Line:   150 mg, 1 tabs, Oral, Once a Day (at bedtime), 0 Refill(s) ; Ordering Provider:   Nasima AMIN; Catalog Code:   traZODone ; Order Dt/Tm:   7/20/2022 14:45:22 EDT          metoclopramide  :   metoclopramide ; Status:   Documented ; Ordered As Mnemonic:   metoclopramide 10 mg oral tablet ; Simple Display Line:   5 mg, 0.5 tabs, Oral, QIDACHS, 0 Refill(s) ; Ordering Provider:   Nasima AMIN; Catalog Code:   metoclopramide ; Order Dt/Tm:   7/20/2022 14:44:09 EDT          gabapentin  :   gabapentin ; Status:   Documented ; Ordered As Mnemonic:   gabapentin 100 mg oral capsule ; Simple Display Line:   100 mg, 1 caps, Oral, TID, 0 Refill(s) ; Ordering Provider:   Nasima AMIN; Catalog Code:   gabapentin ; Order Dt/Tm:   7/20/2022 14:43:56 EDT          docusate-senna  :   docusate-senna ; Status:   Completed ; Ordered As Mnemonic:   docusate-senna 50 mg-8.6 mg oral tablet ; Simple Display Line:   2 tabs, Oral, BID, 0 Refill(s) ;  Ordering Provider:   Autumn LYN; Catalog Code:   docusate-senna ; Order Dt/Tm:   5/23/2022 13:42:49 EDT          omeprazole  :   omeprazole ; Status:   Documented ; Ordered As Mnemonic:   PriLOSEC 40 mg oral delayed release capsule ; Simple Display Line:   40 mg, 1 caps, Oral, BID, 0 Refill(s) ; Catalog Code:   omeprazole ; Order Dt/Tm:   3/18/2022 15:08:41 EDT          acetaminophen  :   acetaminophen ; Status:   Documented ; Ordered As Mnemonic:   Tylenol Regular Strength 325 mg oral tablet ; Simple Display Line:   325 mg, 1 tabs, Oral, TID, 0 Refill(s) ; Catalog Code:   acetaminophen ; Order Dt/Tm:   3/15/2022 16:26:12 EDT          ondansetron  :   ondansetron ; Status:   Documented ; Ordered As Mnemonic:   Zofran 4 mg oral tablet ; Simple Display Line:   4 mg, 1 tabs, Oral, q8hr, PRN: as needed for nausea/vomiting, 0 Refill(s) ; Catalog Code:   ondansetron ; Order Dt/Tm:   3/15/2022 16:26:33 EDT          lisinopril  :   lisinopril ; Status:   Documented ; Ordered As Mnemonic:   lisinopril 40 mg oral tablet ; Simple Display Line:   40 mg, 1 tabs, Oral, qAM, 0 Refill(s) ; Catalog Code:   lisinopril ; Order Dt/Tm:   5/26/2021 11:10:13 EDT            Problem History   (As Of: 8/1/2022 22:17:06 EDT)   Problems(Active)    Back pain (SNOMED CT  :823288883 )  Name of Problem:   Back pain ; Recorder:   Rasheed Walton RN, [address] [place] [address]; Confirmation:   Confirmed ; Classification:   Patient Stated ; Code:   [id number] ; Contributor System:   PowerChart ; Last Updated:   3/8/2022 7:49 EST ; Life Cycle Date:   3/8/2022 ; Life Cycle Status:   Active ; Vocabulary:   SNOMED CT        Cerebral aneurysm (SNOMED CT  :599795932 )  Name of Problem:   Cerebral aneurysm ; Recorder:   JUSTINO Fonseca Tiffany A; Confirmation:   Confirmed ; Classification:   Patient Stated ; Code:   [id number] ; Contributor System:   PowerChart ; Last Updated:   3/16/2022 15:17 EDT ;  Life Cycle Status:   Active ; Vocabulary:   SNOMED CT        Esophageal stricture (SNOMED CT  :514391205 )  Name of Problem:   Esophageal stricture ; Recorder:   JUSTINO MAJOR, RIO L; Confirmation:   Confirmed ; Classification:   Patient Stated ; Code:   [id number] ; Contributor System:   PowerChart ; Last Updated:   3/16/2022 15:14 EDT ; Life Cycle Date:   3/16/2022 ; Life Cycle Status:   Active ; Vocabulary:   SNOMED CT        History of fractured vertebra (SNOMED CT  :8717878548 )  Name of Problem:   History of fractured vertebra ; Recorder:   India Castañeda, [address] [facility]; Confirmation:   Confirmed ; Classification:   Patient Stated ; Code:   [id number] ; Contributor System:   PowerChart ; Last Updated:   3/8/2022 7:49 EST ; Life Cycle Date:   3/8/2022 ; Life Cycle Status:   Active ; Vocabulary:   SNOMED CT        Hypertension (SNOMED CT  :0222489691 )  Name of Problem:   Hypertension ; Recorder:   JUSTINO Fonseca, Dyan CLEMENTE; Confirmation:   Confirmed ; Classification:   Patient Stated ; Code:   8942883118 ; Contributor System:   PowerChart ; Last Updated:   1/27/2021 17:37 EST ; Life Cycle Date:   1/27/2021 ; Life Cycle Status:   Active ; Vocabulary:   SNOMED CT        Motion sickness (IMO  :10793 )  Name of Problem: Motion sickness ; Recorder:   SYSTEM,  SYSTEM; Confirmation:   Confirmed ; Classification:   Patient Stated ; Code:   88576 ; Last Updated:   3/8/2022 7:50 EST ; Life Cycle Date:   3/8/2022 ; Life Cycle Status:   Active ; Vocabulary:   IMO        MRSA (methicillin resistant Staphylococcus aureus) (SNOMED CT  :8893523307 )  Name of Problem:   MRSA (methicillin resistant Staphylococcus aureus) ; Onset Date:   3/18/2022 ; Recorder:   Og Rubi; Confirmation:   Confirmed ; Classification:   Patient Stated ; Code:   7575653747 ; Contributor System:   PowerChart ; Last Updated:   3/22/2022 13:31 EDT ;  Life Cycle Date:   3/22/2022 ; Life Cycle Status:   Active ; Vocabulary:   SNOMED CT   ; Comments:        3/22/2022 13:31 - Mary Ann Barreto,  Dorice Bellis  MRSA Alert - nares      Trigeminal neuralgia (SNOMED CT  :00627192 )  Name of Problem:   Trigeminal neuralgia ; Recorder:   Adan Pruitt RN, Nik Devine; Confirmation:   Confirmed ; Classification:   Patient Stated ; Code:   70919903 ; Contributor System:   [employer] ; Last Updated:   4/5/2021 10:43 EDT ; Life Cycle Date:   4/5/2021 ; Life Cycle Status:   Active ; Vocabulary:   SNOMED CT          Diagnoses(Active)    Acute hyponatremia  Date:   8/1/2022 ; Diagnosis Type:   Discharge ; Confirmation:   Confirmed ; Clinical Dx:   Acute hyponatremia ; Classification:   Medical ; Clinical Service:   Non-Specified ; Code:   ICD-10-CM ; Probability:   0 ; Diagnosis Code:   E87.1      Chronic hip pain  Date:   8/1/2022 ; Diagnosis Type:   Discharge ; Confirmation:   Confirmed ; Clinical Dx:   Chronic hip pain ; Classification:   Medical ; Clinical Service:   Non-Specified ; Code:   ICD-10-CM ; Probability:   0 ; Diagnosis Code:   M25.559      Gastroparesis  Date:   8/1/2022 ; Diagnosis Type:   Discharge ; Confirmation:   Confirmed ; Clinical Dx:   Gastroparesis ; Classification:   Medical ; Clinical Service:   Non-Specified ; Code:   ICD-10-CM ; Probability:   0 ; Diagnosis Code:   K31.84      Generalized weakness  Date:   8/1/2022 ; Diagnosis Type:   Discharge ; Confirmation:   Confirmed ; Clinical Dx:   Generalized weakness ; Classification:   Medical ; Clinical Service:   Non-Specified ; Code:   ICD-10-CM ; Probability:   0 ; Diagnosis Code:   R53.1      History of GI bleed  Date:   8/1/2022 ; Diagnosis Type:   Discharge ; Confirmation:   Confirmed ; Clinical Dx:   History of GI bleed ; Classification:   Medical ; Clinical Service:   Non-Specified ; Code:   ICD-10-CM ; Probability:   0 ; Diagnosis Code:   Z87.19      Hypertension  Date:   8/1/2022 ; Diagnosis Type:   Discharge ; Confirmation:   Confirmed ; Clinical Dx:   Hypertension ; Classification:   Patient Stated ; Clinical Service:   Non-Specified ; Code:   ICD-10-CM ;  Probability:   0 ; Diagnosis Code:   U88 Insomnia  Date:   8/1/2022 ; Diagnosis Type:   Discharge ; Confirmation:   Confirmed ; Clinical Dx: Insomnia ; Classification:   Medical ; Clinical Service:   Non-Specified ; Code:   ICD-10-CM ; Probability:   0 ; Diagnosis Code:   G47.00      Normocytic anemia  Date:   8/1/2022 ; Diagnosis Type:   Discharge ; Confirmation:   Confirmed ; Clinical Dx:   Normocytic anemia ; Classification:   Medical ; Clinical Service:   Non-Specified ; Code:   ICD-10-CM ; Probability:   0 ; Diagnosis Code:   D64.9      Trigeminal neuralgia  Date:   8/1/2022 ; Diagnosis Type:   Discharge ; Confirmation:   Confirmed ; Clinical Dx:   Trigeminal neuralgia ; Classification:   Patient Stated ; Clinical Service:   Non-Specified ; Code:   ICD-10-CM ;  Probability:   0 ; Diagnosis Code:   G50.0        Procedure History        -    Procedure History   (As Of: 8/1/2022 22:17:06 EDT)     Anesthesia Minutes:   0 ; Procedure Name:   Mastectomy ; Procedure Minutes:   0            Anesthesia Minutes:   0 ; Procedure Name:   Tonsillectomy ; Procedure Minutes:   0            Anesthesia Minutes:   0 ; Procedure Name:   Hysterectomy ; Procedure Minutes:   0            Procedure Dt/Tm:   9/9/2021 12:35:00 EDT ; Location:   [facility] ; Provider:   José Manuel Alvarez; Anesthesia Type:   General ; :   Yaw EATON; Anesthesia Minutes:   0 ; Procedure Name:   Claretha Latch ; Procedure Minutes:   10 ; Comments:     9/9/2021 13:14 RONEL LINARES RN, PENNE L  auto-populated from documented surgical case ; Clinical Service:   Surgery            Procedure Dt/Tm:   9/9/2021 12:35:00 EDT ; Location:   [facility] ; Provider:   José Manuel Alvarez; Anesthesia Type:   General ; :   Yaw EATON; Anesthesia Minutes:   0 ; Procedure Name:   Claretha Latch with Biopsy / Spreckels ; Procedure Minutes:   10 ; Comments:     9/9/2021 13:14 RONEL LINARES RN, PENNE L  auto-populated from documented surgical case ; Clinical Service: Surgery            Anesthesia Minutes:   0 ; Procedure Name:   Total replacement of right knee joint ; Procedure Minutes:   0            Procedure Dt/Tm:   3/10/2022 09:52:00 EST ; Location:    OR ; Provider:   SHERI LUGO; Anesthesia Type:   General ; :   Ana BROOKE; Anesthesia Minutes:   0 ; Procedure Name:   EGD with or without dilatation in OR ; Procedure Minutes:   27 ; Comments:     3/10/2022 10:46 EST - Charlene Gonzalez  auto-populated from documented surgical case ; Clinical Service:   Surgery            Procedure Dt/Tm:   12/2021 ;  Anesthesia Minutes:   0 ; Procedure Name:   Dilatation of esophageal stricture ; Procedure Minutes:   0            Procedure Dt/Tm:   3/15/2022 13:55:00 EDT ; Location:    Endoscopy ; Provider:   Eduar Strauss; Anesthesia Type:   Monitored Anesthesia Care ; :   Reymundo Caldwell; Anesthesia Minutes:   0 ; Procedure Name:   Kevin John ; Procedure Minutes:   4 ; Comments:     3/15/2022 14:04 EDT - Norma FERRELL  auto-populated from documented surgical case ; Clinical Service:   Surgery            Procedure Dt/Tm:   3/18/2022 14:28:00 EDT ; Location:    Endoscopy ; Provider:   Rosana LOVE; Anesthesia Type:   Monitored Anesthesia Care ; :   Fred CALIXTO; Anesthesia Minutes:   0 ; Procedure Name:   Kevin John ; Procedure Minutes:   4 ; Comments:     3/18/2022 14:35 EDT - Jennifer Leblanc RN, Bridget Ward from documented surgical case ; Clinical Service:   Surgery            Procedure Dt/Tm:   5/17/2022 17:58:00 EDT ; Location:   [address] [place] [address] ; Provider:   Michaela AIKEN; Anesthesia Type:   General ; :   Aleks BRITO; Anesthesia Minutes:   0 ; Procedure Name:   Femur Closed IM Rodding SCIP ; Procedure Minutes:   33 ; Comments:     5/17/2022 19:00 EDT - JUSTINO LINARES PENNE L  auto-populated from documented surgical case ; Clinical Service:   Surgery 3/8/2022 07:42:42 EST by Viviane España RN, [facility])          Electronic Cigarette/Vaping:        Never Electronic Cigarette Use.    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 :   No   Do you have any Yazidism/spiritual/cultural beliefs that could impact the way your care is provided? :   No   Jolly French 8/1/2022 22:11 EDT   Harm Screen   Suspect or Concern for: :   None   Feels Safe Where Live :   Yes   Last 3 mo, thoughts killing self/others :   Patient denies   Jolly French 8/1/2022 22:11 EDT   Advance Directive   Advance Directive :   No   Type of Advance Directive :   Living will   Patient Wishes to Receive Further Information on Advance Directives :   No   Jolly French 8/1/2022 22:11 EDT   Education   Written Language :   [employer]   Primary Language :   Amparo French 8/1/2022 22:11 EDT   Caregiver/Advocate Language   Patient :   Printed materials   Family :   Printed materials   Jolly French 8/1/2022 22:11 EDT   Barriers to Learning :   None evident   Jolly French 8/1/2022 22:11 EDT   DC Needs   CM Living Situation :   Home with no services   Home Caregiver Name/Relationship :   Kike Marcelino -    Current Living Situation :   [phone]   Jolly French 8/1/2022 22:11 EDT   Valuables and Belongings   Valuables and Belongings   At Bedside :   Purse/Wallet   Jolly French 8/1/2022 22:11 EDT   Admission Complete   Admission Complete :   Yes   Jolly French 8/1/2022 22:11 EDT

## 2022-10-29 NOTE — PROGRESS NOTES
Inpatient PT Examination - Text       Inpatient PT Examination Entered On:  8/2/2022 14:55 EDT    Performed On:  8/2/2022 14:30 EDT by Bonifacio Sorensen, PT, Jayda Ray               Reason for Treatment   *Reason for Referral :   Pt is a 81 yo F admitted with acute hyponatremia, chronic R hip pain. Precautions: fall risk     PMH: CM nailing for R intertrochanteric femur fx 5/17/2022, fall 4/27/2022 with head trauma and dx with gastroenteritis, chronic back pain, cerebral aneurysm with ICH s/p surgical management, esophageal stricture s/p dilation, vertebral fx, motion sickness, MRSA, trigeminal neuralgia, R TKA, mastectomy, GI bleed, chronic constipation        Subjective Statement :   RN states pt is okay for PT eval; pt agreeable to participate      Radha Palacio - 8/2/2022 15:33 EDT   General Info   Physical Therapy Orders :   PT Evaluation and Treatment Acute - 08/01/22 21:41:00 EDT, Stop date 08/01/22 21:41:00 EDT, A consult cannot be completed if there is a bedrest activity order; check for bedrest order.      Precautions RTF :    Hospitalist Team, 08/02/22 12:10:00 EDT, [address] Team A, Constant Indicator, Acute hyponatremia  Chronic hip pain  Generalized weakness  Trigeminal neuralgia  Hypertension, Ordered   Low Risk HD Precaution, 08/01/22 23:06:16 EDT, Constant Order, Ordered   Seizure Precautions, 08/01/22 21:43:00 EDT, Constant Order, Ordered   Communication to Nursing, 08/01/22 21:41:00 EDT, RD may manage/modify diet order and/or enteral nutrition per approved MNT protocol, 08/01/22 21:41:00 EDT, 08/01/22 21:41:00 EDT, Ordered   Notify Rapid Response Team, 08/01/22 21:41:00 EDT, For concerns regarding patient condition & notify MD, 08/01/22 21:41:00 EDT, 08/01/22 21:41:00 EDT, Ordered   Change attending to, 08/01/22 20:47:36 EDT, Rox Ohara                                                                              ,Physician - Hospitalist, Ordered   Medicare Observation ED patient, 08/01/22 20:47:36 EDT, NOT [facility] pharmacy, laboratory, radiology. , 08/01/22 20:47:36 EDT, Ordered   COVID-19 Status, 08/01/22 17:21:59 EDT, NOT VALID FOR pharmacy, laboratory, radiology. , 08/01/22 17:21:59 EDT, COVID-19 Not Detected, Ordered   Notify Provider, 08/01/22 17:21:58 EDT, This message can only be seen by Nursing, it is not visible to Pharmacy, Laboratory, or Radiology. , 08/01/22 17:21:58 EDT, Ordered   Patient Isolation, 08/01/22 17:21:00 EDT, Contact and Airborne, Discontinued   ED Tracking - MRSA, 08/01/22 17:05:30 EDT, NOT VALID FOR pharmacy, laboratory, radiology. , 08/01/22 17:05:30 EDT, Ordered   Inpatient 30 Day Readmission, 08/01/22 17:05:30 EDT, This message can only be seen by Nursing, it is not visible to Pharmacy, Laboratory, or Radiology. , 08/01/22 17:05:30 EDT, Ordered     Affect/Behavior :   Cooperative   Basic Command Following :   Intact   Safety/Judgment :   Impaired   Pain Present :   No actual or suspected pain   Jamal Jacobs - 8/2/2022 15:33 EDT   Problem List   (As Of: 8/2/2022 15:33:51 EDT)   Problems(Active)    Back pain (SNOMED CT  :258596598 )  Name of Problem:   Back pain ; Recorder:   Janette Calero RN, [address] [place] [address]; Confirmation:   Confirmed ; Classification:   Patient Stated ; Code:   592485825 ; Contributor System:   PowerChart ; Last Updated:   3/8/2022 7:49 EST ; Life Cycle Date:   3/8/2022 ; Life Cycle Status:   Active ; Vocabulary:   SNOMED CT        Cerebral aneurysm (SNOMED CT  :682261627 )  Name of Problem:   Cerebral aneurysm ; Recorder:   JUSTINO Fonseca, Dyan CLEMENTE; Confirmation:   Confirmed ; Classification:   Patient Stated ; Code:   598613942 ; Contributor System:   PowerChart ; Last Updated:   3/16/2022 15:17 EDT ; Life Cycle Status:   Active ; Vocabulary:   SNOMED CT        Esophageal stricture (SNOMED CT  :846332431 )  Name of Problem:   Esophageal stricture ;  Recorder:   JUSTINO MAJOR JENIFER L; Confirmation:   Confirmed ; Classification:   Patient Stated ; Code:   860106993 ; Contributor System:   [employer] ; Last Updated:   3/16/2022 15:14 EDT ; Life Cycle Date:   3/16/2022 ; Life Cycle Status:   Active ; Vocabulary:   SNOMED CT        History of fractured vertebra (SNOMED CT  :7443490521 )  Name of Problem:   History of fractured vertebra ; Recorder:   Annette Bedolla, [address] [facility]; Confirmation:   Confirmed ; Classification:   Patient Stated ; Code:   7858818976 ; Contributor System:   [employer] ; Last Updated:   3/8/2022 7:49 EST ; Life Cycle Date:   3/8/2022 ; Life Cycle Status:   Active ; Vocabulary:   SNOMED CT        Hypertension (SNOMED CT  :1562395947 )  Name of Problem:   Hypertension ; Recorder:   JUSTINO Fonseca, Dyan CLEMENTE; Confirmation:   Confirmed ; Classification:   Patient Stated ; Code:   1347079564 ; Contributor System:   [employer] ; Last Updated:   1/27/2021 17:37 EST ; Life Cycle Date:   1/27/2021 ; Life Cycle Status:   Active ; Vocabulary:   SNOMED CT        Motion sickness (IMO  :15526 )  Name of Problem: Motion sickness ; Recorder:   SYSTEM,  SYSTEM; Confirmation:   Confirmed ; Classification:   Patient Stated ; Code:   09106 ; Last Updated:   3/8/2022 7:50 EST ; Life Cycle Date:   3/8/2022 ; Life Cycle Status:   Active ; Vocabulary:   IMO        MRSA (methicillin resistant Staphylococcus aureus) (SNOMED CT  :8601560627 )  Name of Problem:   MRSA (methicillin resistant Staphylococcus aureus) ; Onset Date:   3/18/2022 ; Recorder:   Jabier Nageotte; Confirmation:   Confirmed ; Classification:   Patient Stated ; Code:   2612617948 ; Contributor System:   [employer] ; Last Updated:   3/22/2022 13:31 EDT ; Life Cycle Date:   3/22/2022 ; Life Cycle Status:   Active ; Vocabulary:   SNOMED CT   ; Comments:        3/22/2022 13:31 - [place] [facility],  Laura Pace  MRSA Alert - nares      Trigeminal neuralgia (SNOMED CT  :10946884 )  Name of Problem:   Trigeminal neuralgia ;  Recorder:   Alvaro Orr RN, Chauncey Chavez; Confirmation:   Confirmed ; Classification:   Patient Stated ; Code:   47937119 ; Contributor System:   PowerChart ; Last Updated:   4/5/2021 10:43 EDT ; Life Cycle Date:   4/5/2021 ; Life Cycle Status:   Active ; Vocabulary:   SNOMED CT          Diagnoses(Active)    Acute hyponatremia  Date:   8/1/2022 ; Diagnosis Type:   Discharge ; Confirmation:   Confirmed ; Clinical Dx:   Acute hyponatremia ; Classification:   Medical ; Clinical Service:   Non-Specified ; Code:   ICD-10-CM ; Probability:   0 ; Diagnosis Code:   E87.1      Chronic hip pain  Date:   8/1/2022 ; Diagnosis Type:   Discharge ; Confirmation:   Confirmed ; Clinical Dx:   Chronic hip pain ; Classification:   Medical ; Clinical Service:   Non-Specified ; Code:   ICD-10-CM ; Probability:   0 ; Diagnosis Code:   M25.559      Gastroparesis  Date:   8/1/2022 ; Diagnosis Type:   Discharge ; Confirmation:   Confirmed ; Clinical Dx:   Gastroparesis ; Classification:   Medical ; Clinical Service:   Non-Specified ; Code:   ICD-10-CM ; Probability:   0 ; Diagnosis Code:   K31.84      Generalized weakness  Date:   8/1/2022 ; Diagnosis Type:   Discharge ; Confirmation:   Confirmed ; Clinical Dx:   Generalized weakness ; Classification:   Medical ; Clinical Service:   Non-Specified ; Code:   ICD-10-CM ; Probability:   0 ; Diagnosis Code:   R53.1      History of GI bleed  Date:   8/1/2022 ; Diagnosis Type:   Discharge ; Confirmation:   Confirmed ; Clinical Dx:   History of GI bleed ; Classification:   Medical ; Clinical Service:   Non-Specified ; Code:   ICD-10-CM ; Probability:   0 ; Diagnosis Code:   Z87.19      Hypertension  Date:   8/1/2022 ; Diagnosis Type:   Discharge ; Confirmation:   Confirmed ; Clinical Dx:   Hypertension ; Classification:   Patient Stated ; Clinical Service:   Non-Specified ; Code:   ICD-10-CM ; Probability:   0 ; Diagnosis Code:   I10      Insomnia  Date:   8/1/2022 ; Diagnosis Type:   Discharge ; Confirmation:   Confirmed ; Clinical Dx:    Insomnia ; Classification:   Medical ; Clinical Service:   Non-Specified ; Code: ICD-10-CM ; Probability:   0 ; Diagnosis Code:   G47.00      Muscle weakness (generalized)  Date:   8/2/2022 ; Diagnosis Type: Other ; Confirmation:   Differential ; Clinical Dx:   Muscle weakness (generalized) ; Classification:   Interdisciplinary ; Clinical Service:   Non-Specified ; Code:   ICD-10-CM ; Probability:   0 ; Diagnosis Code:   M62.81      Normocytic anemia  Date:   8/1/2022 ; Diagnosis Type:   Discharge ; Confirmation:   Confirmed ; Clinical Dx:   Normocytic anemia ; Classification:   Medical ; Clinical Service:   Non-Specified ; Code:   ICD-10-CM ; Probability:   0 ; Diagnosis Code:   D64.9      Trigeminal neuralgia  Date:   8/1/2022 ; Diagnosis Type:   Discharge ; Confirmation:   Confirmed ; Clinical Dx:   Trigeminal neuralgia ; Classification:   Patient Stated ; Clinical Service:   Non-Specified ; Code:   ICD-10-CM ; Probability:   0 ; Diagnosis Code:   G50.0        Home Environment   Living Environment :   Home Environment  *ADL:    Performed By:  Antonio Adams 08/02/2022  *Instrumental ADL:    Performed By:  Antonio Adams 08/02/2022  *Mobility:    Performed By:  Antonio Adams 08/02/2022  Detail Areas of Responsibilities:    Performed By:  Antonio Adams 08/02/2022  Devices/Equipment at Home:    Performed By:  Antonio Adams 08/02/2022  Lives In:    Performed By:  Antonio Adams 08/02/2022  Lives With:    Performed By:  Antonio Adams 08/02/2022  Living Situation:    Performed By:  Antonio Adams 08/02/2022  Number of Stairs Outside:    Performed By:  Antonio Adams 08/02/2022  Patient's Responsibilities:    Performed By:  Antonio Adams 08/02/2022  Sensory Deficits:  None, Other: Prescription glasses  Performed By:  Ar Calderon 08/02/2022     Living Situation :   Home with family support, Other:  [facility] PT/[facility]   Lives With :   Spouse   Lives In :   Single level home   [address] Staff - 8/2/2022 15:33 EDT   Stairs Inside Stairs  Outside [address]        Number of Stairs :    0    1              Damian Ibrahim, Raine Nsah - 8/2/2022 15:28 EDT  Orkomal [place] - 8/2/2022 15:33 EDT        Patient's Responsibilities :   Meal preparation, Personal ADL   Detail Areas of Responsibilities :   Has someone in 2x/month for cleaning   Orvilla [place]st - 8/2/2022 15:33 EDT   Home Environment II   Living Environment :   Home Environment  Sensory Deficits:  None, Other: Prescription glasses  Performed By:  Arturo Bumpers. D., Willim Islam 08/02/2022     Devices/Equipment :   Commode - Elevated, Grab bars, Walker - Rollator, Other: shower chair   Farhad Rodriguez PT, Raine Nash - 8/2/2022 15:33 EDT   Prior Functional Status   Cognitive-Communication Skills :   Assist needed   Orvilla [place] - 8/2/2022 15:28 EDT   ADL :   Independent   Mobility :   Independent   Instrumental ADL :   Assist needed   Orvilla [place] - 8/2/2022 15:33 EDT   Additional Information :   Pt reports last fall was when she fell in May. Pt ambulates with rollator. Pt has walk-in shower with seat and hand rails, elevated toilet. Farhad Rodriguez PT, Raine Nash - 8/2/2022 15:33 EDT   LE Range/Strength   LE Overall Range of Motion Grid   Left Lower Extremity Passive Range :   Within functional limits   Left Lower Extremity Active Range :   Within functional limits   Right Lower Extremity Passive Range :   Within functional limits   Right Lower Extremity Active Range :   Within functional limits   Orvilla [place] - 8/2/2022 15:33 EDT   Lt Lower Extremity Strength : Other: able to flex hip and flex/extend knee against gravity   Rt Lower Extremity Strength :    Other: able to flex hip and flex/extend knee against gravity; c/o pain R hip with AROM   Orvilla [place] - 8/2/2022 15:33 EDT   Sensation   Left Lower Extremity Sensation   Light Touch :   Intact   Orvilla [place] - 8/2/2022 15:33 EDT   Right Lower Extremity Sensation   Light Touch :   Intact   ROYER Rizo, Margodale Rankinlaci - 8/2/2022 15:33 EDT   Mobility   Ambulation Distance :   175 ft   [place] - 8/2/2022 15:28 EDT   Farhad GerhardROYER rosen, [facility] - 8/2/2022 15:28 EDT   Mobility Grid   Supine to Sit :   Rehab Modified independence   Sit to Supine :   Rehab Modified independence   Edge of Bed Assist :   Rehab Complete independence   Transfer Sit to Stand :   Rehab Modified independence   Transfer Stand to Sit :   Rehab Modified independence   Haresh Evans - 8/2/2022 15:33 EDT   Ambulation Level Acute :   Supervision or setup   Ambulation Quality :   decreased safety awareness, required VC with increased gait distance/fatigue to maintain TELLY closer to walker and for upright posture, to look ahead rather than down at the floor   Device :   Gait belt, Rolling walker   Haresh Evans - 8/2/2022 15:33 EDT   Balance   Balance Tests Performed : Other: sitting: good; standing: fair   Farhad Rodriguez PT, [place] [facility] - 8/2/2022 15:33 EDT   AM-PAC Basic Mobility   AM-PAC Basic Mobility   Turning from your back on your side while in a flat bed without using bedrails? :   None = 4 points   Moving from lying on your back to sitting on the side of a flat bed without using bedrails? :   None = 4 points   Moving to and from a bed to a chair (including a wheelchair)? :   None = 4 points   Standing up from a chair using your arms (e.g. wheelchair or bedside chair)? :   None = 4 points   To walk in hospital room? :   A little = 3 points   Climbing 3-5 steps with a railing? * :   A little = 3 points   Haresh Evans - 8/2/2022 15:33 EDT   AM-PAC Mobility Raw Score :   22    ROYER Rizo, Margo Rankinlaci - 8/2/2022 15:33 EDT   Assessment   PT Treatment Recommendations :   Pt admitted with acute hyponatremia, c/o chronic R hip pain. Pt was able to flex B hips and flex/extend B knees against gravity.  Pt t/f sup<>sit<>stand mod I and ambulated 175 ft with RW and SPV demonstrating decreased safety awareness, required VC with increased gait distance/fatigue to maintain TELLY closer to walker and for upright posture, to look ahead rather than down at the floor. Pt appears near her functional baseline due to cognitive impairment, reiterated importance of using RW at all times to reduce fall risk. Pt returned to supine with bed alarm on, call button within reach and RN present. Pt would benefit from continued skilled PT to improve functional LE strength, mobility, balance and safety to reduce fall risk.       Farhad Rodriguez PT, Raine French 8/2/2022 15:28 EDT   PT Impairments or Limitations :   Ambulation deficits, Balance deficits, Cognitive deficits, Endurance deficits, Pain limiting function, Safety awareness deficits, Strength deficits, Transfer deficits, Transition deficits   Discharge Recommendations :   return home and resume [facility]     D/CÂ Transportation Recommendations :   No stretcher   D/C Transportation Recommendations Reviewed Manish Puentes 8/2/2022 15:33 EDT   Education   Responsible Learner Present for Session :   Yes   Home Caregiver Name/Relationship :   Cheri Hill -    Teaching Method :   Explanation   Segundo Awan - 8/2/2022 15:28 EDT   Physical Therapy Education [facility]   Physical Therapy Plan of Care :   Verbalizes understanding   Orallipat[place]akenya [place] - 8/2/2022 15:28 EDT   Additional Session Learner/s Present :   Shannon De León PT, Raine Nash - 8/2/2022 15:28 EDT   Long Term Goals   PT LT Goals Reviewed :   Junior Preston PT, Gorman Hamming - 8/2/2022 15:28 EDT   Short Term Goals   PT ST Goals Reviewed :   Junior Preston PT, Gorman Hamming - 8/2/2022 15:28 EDT   Other PT Goals Grid     Goal #1  Goal #2  Goal #3      Goal :    Pt will amb 300 ft with RW mod I    Pt will score >24/28 on Tinetti balance test to reduce fall risk   Pt/family will be mod I with HEP        Time Frame :    within 7 tx sessions              Status :    Initial   Initial   Initial          Orallilla [place] - 8/2/2022 15:28 EDT  Xiomara Ibrahim - 8/2/2022 15:28 EDT  Farhad Rodriguez PTRodolfoSpringfieldkiara Alegre - 8/2/2022 15:28 EDT       Plan   Evaluation Date :   8/2/2022 14:30 EDT   PT Frequency Acute :   Daily   Duration :   2    PT Duration Unit Rehab :   Weeks   Treatments Planned :   Balance training, Bed mobility training, Caregiver training, Functional training, Gait training, Manual therapy, Moist heat/ice, Neuromuscular reeducation, Patient education, Stair training, Therapeutic activities, Therapeutic exercises, Transfer training   Treatment Plan/Goals Established With Patient/Caregiver :   Yes   Other PT Treatment Provided :   pt will be seen 5-7x/week    Evaluation Complete :   Yes   Farhad Rodriguez PTDarin - 8/2/2022 15:28 EDT   Time Spent With Patient   PT Individual Eval Time, Low Complexity :   15 minutes   PT Evaluation Units, Low Complexity :   1 Unit   PT Functional Training Units :   1 units   PT Functional Training Time :   10 minutes   PT Total Timed Code Treatment Units :   1 units   PT Total Timed Code Min :   10    PT Total Untimed Min :   15    PT Total Treatment Time Acute/OP :   Aia 16 - 8/2/2022 15:28 EDT

## 2022-10-29 NOTE — PROGRESS NOTES
Inpatient PT Daily Documentation - Text       Inpatient PT Daily Documentation Entered On:  8/3/2022 14:56 EDT    Performed On:  8/3/2022 14:55 EDT by Leandra Garcia               Reason for Treatment   Subjective Statement :   Pt reports she is having chest pain and states that RN and MD aware,      Leandra Garcia - 8/3/2022 14:56 EDT   *Reason for Referral :   Pt is a 81 yo F admitted with acute hyponatremia, chronic R hip pain. Precautions: fall risk     PMH: CM nailing for R intertrochanteric femur fx 5/17/2022, fall 4/27/2022 with head trauma and dx with gastroenteritis, chronic back pain, cerebral aneurysm with ICH s/p surgical management, esophageal stricture s/p dilation, vertebral fx, motion sickness, MRSA, trigeminal neuralgia, R TKA, mastectomy, GI bleed, chronic constipation        ROYER Hunter Norbert Mclean - 8/3/2022 14:55 EDT   Pain Assessment   Pain Present :   Yes actual or suspected pain   ROYER Hunter Norbert Mclean - 8/3/2022 14:56 EDT   Mobility   Mobility Grid   Supine to Sit :   Rehab Minimal assistance   (Comment: for trunk. Pt states she is in toomuch pain to do without assistance ROYER Wiley Norbert Mclean - 8/3/2022 15:10 EDT] )   Transfer Sit to Stand :   Rehab Supervision   Transfer Stand to Sit :   Rehab Supervision   Transfer Bed To and From Chair :   Sreekanth Vega - 8/3/2022 15:10 EDT   Therapeutic Exercise   PT Therapeutic Exercise Acute/OT Grid     Exercise 1  Exercise 2        Exercise :    Lower extremity strengthening   Lower extremity strengthening           Repetition/Time :    10 x   5 x           Response :     Tolerated well   Integrated muscular activation into functional activity           Completed :    Yes   Yes           Comment :    supine heelsides, ankle pumps, glute sets, quads sets, hip abd B LEs.   sit to stand from bed with no UE assist x 5             Leandra Garcia - 8/3/2022 14:58 EDT  ROYER Hunter Norbert Mclean - 8/3/2022 14:58 EDT

## 2023-04-26 ENCOUNTER — NEW PATIENT (OUTPATIENT)
Dept: URBAN - METROPOLITAN AREA CLINIC 4 | Facility: CLINIC | Age: 83
End: 2023-04-26

## 2023-04-26 DIAGNOSIS — H26.493: ICD-10-CM

## 2023-04-26 DIAGNOSIS — H53.2: ICD-10-CM

## 2023-04-26 PROCEDURE — 92004 COMPRE OPH EXAM NEW PT 1/>: CPT

## 2023-04-26 PROCEDURE — 92015 DETERMINE REFRACTIVE STATE: CPT

## 2023-04-26 ASSESSMENT — KERATOMETRY
OD_K1POWER_DIOPTERS: 43.75
OS_K2POWER_DIOPTERS: 44.25
OD_AXISANGLE_DEGREES: 104
OS_AXISANGLE_DEGREES: 79
OS_K1POWER_DIOPTERS: 43.50
OS_AXISANGLE2_DEGREES: 169
OD_K2POWER_DIOPTERS: 44.50
OD_AXISANGLE2_DEGREES: 14

## 2023-04-26 ASSESSMENT — VISUAL ACUITY
OD_CC: 20/40-1
OU_CC: 20/30-1
OD_GLARE: 20/70
OS_CC: 20/30-1
OS_GLARE: 20/50

## 2023-04-26 ASSESSMENT — TONOMETRY
OD_IOP_MMHG: 10
OS_IOP_MMHG: 10

## 2023-09-11 ENCOUNTER — FOLLOW UP (OUTPATIENT)
Dept: URBAN - METROPOLITAN AREA CLINIC 4 | Facility: CLINIC | Age: 83
End: 2023-09-11

## 2023-09-11 DIAGNOSIS — H26.493: ICD-10-CM

## 2023-09-11 PROCEDURE — 92012 INTRM OPH EXAM EST PATIENT: CPT

## 2023-09-11 ASSESSMENT — VISUAL ACUITY
OD_CC: 20/400
OS_CC: 20/400

## 2023-09-14 ENCOUNTER — ESTABLISHED PATIENT (OUTPATIENT)
Dept: URBAN - METROPOLITAN AREA CLINIC 16 | Facility: CLINIC | Age: 83
End: 2023-09-14

## 2023-09-14 DIAGNOSIS — H35.372: ICD-10-CM

## 2023-09-14 DIAGNOSIS — H43.823: ICD-10-CM

## 2023-09-14 DIAGNOSIS — H43.22: ICD-10-CM

## 2023-09-14 DIAGNOSIS — H35.073: ICD-10-CM

## 2023-09-14 PROCEDURE — 92134 CPTRZ OPH DX IMG PST SGM RTA: CPT

## 2023-09-14 PROCEDURE — 92201 OPSCPY EXTND RTA DRAW UNI/BI: CPT

## 2023-09-14 PROCEDURE — 92014 COMPRE OPH EXAM EST PT 1/>: CPT

## 2023-09-14 ASSESSMENT — VISUAL ACUITY
OD_PH: 20/200
OS_PH: 20/200
OD_CC: 20/200
OU_CC: 20/200
OS_CC: 20/200+1

## 2023-09-14 ASSESSMENT — TONOMETRY
OD_IOP_MMHG: 14
OS_IOP_MMHG: 13